# Patient Record
Sex: FEMALE | Race: BLACK OR AFRICAN AMERICAN | NOT HISPANIC OR LATINO | URBAN - METROPOLITAN AREA
[De-identification: names, ages, dates, MRNs, and addresses within clinical notes are randomized per-mention and may not be internally consistent; named-entity substitution may affect disease eponyms.]

---

## 2018-04-09 ENCOUNTER — EMERGENCY (EMERGENCY)
Facility: HOSPITAL | Age: 53
LOS: 0 days | Discharge: HOME | End: 2018-04-09
Attending: EMERGENCY MEDICINE
Payer: COMMERCIAL

## 2018-04-09 VITALS
HEIGHT: 62 IN | RESPIRATION RATE: 17 BRPM | WEIGHT: 177.91 LBS | SYSTOLIC BLOOD PRESSURE: 135 MMHG | HEART RATE: 100 BPM | TEMPERATURE: 98 F | OXYGEN SATURATION: 100 % | DIASTOLIC BLOOD PRESSURE: 69 MMHG

## 2018-04-09 VITALS
HEART RATE: 88 BPM | DIASTOLIC BLOOD PRESSURE: 58 MMHG | RESPIRATION RATE: 18 BRPM | SYSTOLIC BLOOD PRESSURE: 107 MMHG | OXYGEN SATURATION: 97 % | TEMPERATURE: 99 F

## 2018-04-09 DIAGNOSIS — I10 ESSENTIAL (PRIMARY) HYPERTENSION: ICD-10-CM

## 2018-04-09 DIAGNOSIS — Z98.51 TUBAL LIGATION STATUS: Chronic | ICD-10-CM

## 2018-04-09 DIAGNOSIS — R07.9 CHEST PAIN, UNSPECIFIED: ICD-10-CM

## 2018-04-09 DIAGNOSIS — E78.00 PURE HYPERCHOLESTEROLEMIA, UNSPECIFIED: ICD-10-CM

## 2018-04-09 DIAGNOSIS — Z90.49 ACQUIRED ABSENCE OF OTHER SPECIFIED PARTS OF DIGESTIVE TRACT: Chronic | ICD-10-CM

## 2018-04-09 LAB
ALBUMIN SERPL ELPH-MCNC: 3.6 G/DL — SIGNIFICANT CHANGE UP (ref 3.3–5)
ALP SERPL-CCNC: 95 U/L — SIGNIFICANT CHANGE UP (ref 40–120)
ALT FLD-CCNC: 46 U/L — SIGNIFICANT CHANGE UP (ref 12–78)
ANION GAP SERPL CALC-SCNC: 10 MMOL/L — SIGNIFICANT CHANGE UP (ref 5–17)
APPEARANCE UR: CLEAR — SIGNIFICANT CHANGE UP
APTT BLD: 32.3 SEC — SIGNIFICANT CHANGE UP (ref 27.5–37.4)
AST SERPL-CCNC: 38 U/L — HIGH (ref 15–37)
BACTERIA # UR AUTO: ABNORMAL
BASOPHILS # BLD AUTO: 0.05 K/UL — SIGNIFICANT CHANGE UP (ref 0–0.2)
BASOPHILS NFR BLD AUTO: 0.5 % — SIGNIFICANT CHANGE UP (ref 0–2)
BILIRUB SERPL-MCNC: 0.4 MG/DL — SIGNIFICANT CHANGE UP (ref 0.2–1.2)
BILIRUB UR-MCNC: NEGATIVE — SIGNIFICANT CHANGE UP
BUN SERPL-MCNC: 11 MG/DL — SIGNIFICANT CHANGE UP (ref 7–23)
CALCIUM SERPL-MCNC: 8 MG/DL — LOW (ref 8.5–10.1)
CHLORIDE SERPL-SCNC: 104 MMOL/L — SIGNIFICANT CHANGE UP (ref 96–108)
CK MB CFR SERPL CALC: 0.8 NG/ML — SIGNIFICANT CHANGE UP (ref 0.5–3.6)
CO2 SERPL-SCNC: 27 MMOL/L — SIGNIFICANT CHANGE UP (ref 22–31)
COLOR SPEC: YELLOW — SIGNIFICANT CHANGE UP
CREAT SERPL-MCNC: 0.73 MG/DL — SIGNIFICANT CHANGE UP (ref 0.5–1.3)
D DIMER BLD IA.RAPID-MCNC: 174 NG/ML DDU — SIGNIFICANT CHANGE UP
DIFF PNL FLD: ABNORMAL
EOSINOPHIL # BLD AUTO: 0 K/UL — SIGNIFICANT CHANGE UP (ref 0–0.5)
EOSINOPHIL NFR BLD AUTO: 0 % — SIGNIFICANT CHANGE UP (ref 0–6)
EPI CELLS # UR: ABNORMAL
GLUCOSE SERPL-MCNC: 129 MG/DL — HIGH (ref 70–99)
GLUCOSE UR QL: NEGATIVE MG/DL — SIGNIFICANT CHANGE UP
HCG SERPL-ACNC: <1 MIU/ML — SIGNIFICANT CHANGE UP
HCT VFR BLD CALC: 40.8 % — SIGNIFICANT CHANGE UP (ref 34.5–45)
HGB BLD-MCNC: 13 G/DL — SIGNIFICANT CHANGE UP (ref 11.5–15.5)
IMM GRANULOCYTES NFR BLD AUTO: 0.3 % — SIGNIFICANT CHANGE UP (ref 0–1.5)
INR BLD: 1.04 RATIO — SIGNIFICANT CHANGE UP (ref 0.88–1.16)
KETONES UR-MCNC: NEGATIVE — SIGNIFICANT CHANGE UP
LEUKOCYTE ESTERASE UR-ACNC: NEGATIVE — SIGNIFICANT CHANGE UP
LIDOCAIN IGE QN: 61 U/L — LOW (ref 73–393)
LYMPHOCYTES # BLD AUTO: 1.33 K/UL — SIGNIFICANT CHANGE UP (ref 1–3.3)
LYMPHOCYTES # BLD AUTO: 13.6 % — SIGNIFICANT CHANGE UP (ref 13–44)
MCHC RBC-ENTMCNC: 26.3 PG — LOW (ref 27–34)
MCHC RBC-ENTMCNC: 31.9 GM/DL — LOW (ref 32–36)
MCV RBC AUTO: 82.4 FL — SIGNIFICANT CHANGE UP (ref 80–100)
MONOCYTES # BLD AUTO: 0.32 K/UL — SIGNIFICANT CHANGE UP (ref 0–0.9)
MONOCYTES NFR BLD AUTO: 3.3 % — SIGNIFICANT CHANGE UP (ref 2–14)
NEUTROPHILS # BLD AUTO: 8.02 K/UL — HIGH (ref 1.8–7.4)
NEUTROPHILS NFR BLD AUTO: 82.3 % — HIGH (ref 43–77)
NITRITE UR-MCNC: NEGATIVE — SIGNIFICANT CHANGE UP
NRBC # BLD: 0 /100 WBCS — SIGNIFICANT CHANGE UP (ref 0–0)
NT-PROBNP SERPL-SCNC: 33 PG/ML — SIGNIFICANT CHANGE UP (ref 0–125)
PH UR: 6.5 — SIGNIFICANT CHANGE UP (ref 5–8)
PLATELET # BLD AUTO: 319 K/UL — SIGNIFICANT CHANGE UP (ref 150–400)
POTASSIUM SERPL-MCNC: 3.2 MMOL/L — LOW (ref 3.5–5.3)
POTASSIUM SERPL-SCNC: 3.2 MMOL/L — LOW (ref 3.5–5.3)
PROT SERPL-MCNC: 8.3 GM/DL — SIGNIFICANT CHANGE UP (ref 6–8.3)
PROT UR-MCNC: 15 MG/DL
PROTHROM AB SERPL-ACNC: 11.3 SEC — SIGNIFICANT CHANGE UP (ref 9.8–12.7)
RBC # BLD: 4.95 M/UL — SIGNIFICANT CHANGE UP (ref 3.8–5.2)
RBC # FLD: 14.1 % — SIGNIFICANT CHANGE UP (ref 10.3–14.5)
RBC CASTS # UR COMP ASSIST: SIGNIFICANT CHANGE UP /HPF (ref 0–4)
SODIUM SERPL-SCNC: 141 MMOL/L — SIGNIFICANT CHANGE UP (ref 135–145)
SP GR SPEC: 1.01 — SIGNIFICANT CHANGE UP (ref 1.01–1.02)
TROPONIN I SERPL-MCNC: <.015 NG/ML — SIGNIFICANT CHANGE UP (ref 0.01–0.04)
TROPONIN I SERPL-MCNC: <.015 NG/ML — SIGNIFICANT CHANGE UP (ref 0.01–0.04)
UROBILINOGEN FLD QL: NEGATIVE MG/DL — SIGNIFICANT CHANGE UP
WBC # BLD: 9.75 K/UL — SIGNIFICANT CHANGE UP (ref 3.8–10.5)
WBC # FLD AUTO: 9.75 K/UL — SIGNIFICANT CHANGE UP (ref 3.8–10.5)

## 2018-04-09 PROCEDURE — 71045 X-RAY EXAM CHEST 1 VIEW: CPT | Mod: 26

## 2018-04-09 PROCEDURE — 71275 CT ANGIOGRAPHY CHEST: CPT | Mod: 26

## 2018-04-09 PROCEDURE — 74174 CTA ABD&PLVS W/CONTRAST: CPT | Mod: 26

## 2018-04-09 PROCEDURE — 99285 EMERGENCY DEPT VISIT HI MDM: CPT

## 2018-04-09 RX ORDER — MORPHINE SULFATE 50 MG/1
2 CAPSULE, EXTENDED RELEASE ORAL ONCE
Qty: 0 | Refills: 0 | Status: DISCONTINUED | OUTPATIENT
Start: 2018-04-09 | End: 2018-04-09

## 2018-04-09 RX ORDER — LIDOCAINE 4 G/100G
10 CREAM TOPICAL ONCE
Qty: 0 | Refills: 0 | Status: COMPLETED | OUTPATIENT
Start: 2018-04-09 | End: 2018-04-09

## 2018-04-09 RX ORDER — PANTOPRAZOLE SODIUM 20 MG/1
40 TABLET, DELAYED RELEASE ORAL ONCE
Qty: 0 | Refills: 0 | Status: COMPLETED | OUTPATIENT
Start: 2018-04-09 | End: 2018-04-09

## 2018-04-09 RX ORDER — ONDANSETRON 8 MG/1
4 TABLET, FILM COATED ORAL ONCE
Qty: 0 | Refills: 0 | Status: COMPLETED | OUTPATIENT
Start: 2018-04-09 | End: 2018-04-09

## 2018-04-09 RX ADMIN — MORPHINE SULFATE 2 MILLIGRAM(S): 50 CAPSULE, EXTENDED RELEASE ORAL at 09:41

## 2018-04-09 RX ADMIN — PANTOPRAZOLE SODIUM 40 MILLIGRAM(S): 20 TABLET, DELAYED RELEASE ORAL at 13:33

## 2018-04-09 RX ADMIN — LIDOCAINE 10 MILLILITER(S): 4 CREAM TOPICAL at 13:32

## 2018-04-09 RX ADMIN — MORPHINE SULFATE 2 MILLIGRAM(S): 50 CAPSULE, EXTENDED RELEASE ORAL at 12:16

## 2018-04-09 RX ADMIN — Medication 30 MILLILITER(S): at 13:33

## 2018-04-09 RX ADMIN — ONDANSETRON 4 MILLIGRAM(S): 8 TABLET, FILM COATED ORAL at 09:42

## 2018-04-09 NOTE — ED PROVIDER NOTE - CARDIAC, MLM
Normal rate, regular rhythm.  Heart sounds S1, S2.  No murmurs, rubs or gallops. /72 on R, 132/71 on L, pulses equal

## 2018-04-09 NOTE — ED PROVIDER NOTE - PROGRESS NOTE DETAILS
Pt is feeling better after GI cocktail. Serial trops negative, ddimer and CT angio negative. Pt eager for d/c, will fu w PMD.

## 2018-04-09 NOTE — ED PROVIDER NOTE - MEDICAL DECISION MAKING DETAILS
Ddx: ro dissection/ ro esophageal perforation given emesis, but no hematemesis/ gastritis/ pancreatitis/ acs, heart score 1  Plan: Cbc, cmp, ddimer, coags, CT angio, reassess

## 2018-04-09 NOTE — ED ADULT NURSE NOTE - OBJECTIVE STATEMENT
received pt to bed 10 alert and oriented times 4. complaining of chest and back pain. "feel like something sitting on my chest"  since waking this am at 7. pt placed on cm. stat ekg. awaiting eval by md

## 2018-04-09 NOTE — ED PROVIDER NOTE - OBJECTIVE STATEMENT
Pt is a 54 yo lady with a pmhx of HTN, HL who presents to the ED with chest pain after vomiting. She had alcohol last night, not a usual drinker, and had several episodes of emesis, and afterwards had chest pain. No hematemesis, no coffee ground emesis. No abdominal pain. No pleuritic pain, no hx of dvt/pe. No numbness or tingling. Pain radiates to back. No tearing pain, more of a pressure pain.

## 2018-04-10 LAB
CULTURE RESULTS: SIGNIFICANT CHANGE UP
SPECIMEN SOURCE: SIGNIFICANT CHANGE UP

## 2019-02-20 ENCOUNTER — INPATIENT (INPATIENT)
Facility: HOSPITAL | Age: 54
LOS: 6 days | Discharge: HOME | End: 2019-02-27
Attending: STUDENT IN AN ORGANIZED HEALTH CARE EDUCATION/TRAINING PROGRAM | Admitting: STUDENT IN AN ORGANIZED HEALTH CARE EDUCATION/TRAINING PROGRAM
Payer: COMMERCIAL

## 2019-02-20 VITALS
HEART RATE: 92 BPM | TEMPERATURE: 98 F | RESPIRATION RATE: 16 BRPM | DIASTOLIC BLOOD PRESSURE: 93 MMHG | SYSTOLIC BLOOD PRESSURE: 137 MMHG | OXYGEN SATURATION: 100 %

## 2019-02-20 DIAGNOSIS — K57.32 DIVERTICULITIS OF LARGE INTESTINE WITHOUT PERFORATION OR ABSCESS WITHOUT BLEEDING: ICD-10-CM

## 2019-02-20 DIAGNOSIS — Z98.51 TUBAL LIGATION STATUS: Chronic | ICD-10-CM

## 2019-02-20 DIAGNOSIS — Z90.49 ACQUIRED ABSENCE OF OTHER SPECIFIED PARTS OF DIGESTIVE TRACT: Chronic | ICD-10-CM

## 2019-02-20 LAB
ALBUMIN SERPL ELPH-MCNC: 3.7 G/DL — SIGNIFICANT CHANGE UP (ref 3.3–5)
ALP SERPL-CCNC: 83 U/L — SIGNIFICANT CHANGE UP (ref 40–120)
ALT FLD-CCNC: 19 U/L — SIGNIFICANT CHANGE UP (ref 4–33)
ANION GAP SERPL CALC-SCNC: 13 MMO/L — SIGNIFICANT CHANGE UP (ref 7–14)
APPEARANCE UR: CLEAR — SIGNIFICANT CHANGE UP
AST SERPL-CCNC: 25 U/L — SIGNIFICANT CHANGE UP (ref 4–32)
BACTERIA # UR AUTO: NEGATIVE — SIGNIFICANT CHANGE UP
BASOPHILS # BLD AUTO: 0.02 K/UL — SIGNIFICANT CHANGE UP (ref 0–0.2)
BASOPHILS NFR BLD AUTO: 0.2 % — SIGNIFICANT CHANGE UP (ref 0–2)
BILIRUB SERPL-MCNC: 0.4 MG/DL — SIGNIFICANT CHANGE UP (ref 0.2–1.2)
BILIRUB UR-MCNC: NEGATIVE — SIGNIFICANT CHANGE UP
BLOOD UR QL VISUAL: SIGNIFICANT CHANGE UP
BUN SERPL-MCNC: 11 MG/DL — SIGNIFICANT CHANGE UP (ref 7–23)
CALCIUM SERPL-MCNC: 8.6 MG/DL — SIGNIFICANT CHANGE UP (ref 8.4–10.5)
CHLORIDE SERPL-SCNC: 100 MMOL/L — SIGNIFICANT CHANGE UP (ref 98–107)
CO2 SERPL-SCNC: 25 MMOL/L — SIGNIFICANT CHANGE UP (ref 22–31)
COLOR SPEC: SIGNIFICANT CHANGE UP
CREAT SERPL-MCNC: 0.79 MG/DL — SIGNIFICANT CHANGE UP (ref 0.5–1.3)
EOSINOPHIL # BLD AUTO: 0.17 K/UL — SIGNIFICANT CHANGE UP (ref 0–0.5)
EOSINOPHIL NFR BLD AUTO: 1.6 % — SIGNIFICANT CHANGE UP (ref 0–6)
GLUCOSE SERPL-MCNC: 105 MG/DL — HIGH (ref 70–99)
GLUCOSE UR-MCNC: NEGATIVE — SIGNIFICANT CHANGE UP
HCT VFR BLD CALC: 35.3 % — SIGNIFICANT CHANGE UP (ref 34.5–45)
HGB BLD-MCNC: 11.1 G/DL — LOW (ref 11.5–15.5)
HYALINE CASTS # UR AUTO: NEGATIVE — SIGNIFICANT CHANGE UP
IMM GRANULOCYTES NFR BLD AUTO: 0.3 % — SIGNIFICANT CHANGE UP (ref 0–1.5)
KETONES UR-MCNC: NEGATIVE — SIGNIFICANT CHANGE UP
LEUKOCYTE ESTERASE UR-ACNC: NEGATIVE — SIGNIFICANT CHANGE UP
LIDOCAIN IGE QN: 17.6 U/L — SIGNIFICANT CHANGE UP (ref 7–60)
LYMPHOCYTES # BLD AUTO: 2.22 K/UL — SIGNIFICANT CHANGE UP (ref 1–3.3)
LYMPHOCYTES # BLD AUTO: 21.3 % — SIGNIFICANT CHANGE UP (ref 13–44)
MCHC RBC-ENTMCNC: 25.9 PG — LOW (ref 27–34)
MCHC RBC-ENTMCNC: 31.4 % — LOW (ref 32–36)
MCV RBC AUTO: 82.5 FL — SIGNIFICANT CHANGE UP (ref 80–100)
MONOCYTES # BLD AUTO: 0.89 K/UL — SIGNIFICANT CHANGE UP (ref 0–0.9)
MONOCYTES NFR BLD AUTO: 8.6 % — SIGNIFICANT CHANGE UP (ref 2–14)
NEUTROPHILS # BLD AUTO: 7.07 K/UL — SIGNIFICANT CHANGE UP (ref 1.8–7.4)
NEUTROPHILS NFR BLD AUTO: 68 % — SIGNIFICANT CHANGE UP (ref 43–77)
NITRITE UR-MCNC: NEGATIVE — SIGNIFICANT CHANGE UP
NRBC # FLD: 0 K/UL — LOW (ref 25–125)
PH UR: 6.5 — SIGNIFICANT CHANGE UP (ref 5–8)
PLATELET # BLD AUTO: 219 K/UL — SIGNIFICANT CHANGE UP (ref 150–400)
PMV BLD: 10.9 FL — SIGNIFICANT CHANGE UP (ref 7–13)
POTASSIUM SERPL-MCNC: 3.1 MMOL/L — LOW (ref 3.5–5.3)
POTASSIUM SERPL-SCNC: 3.1 MMOL/L — LOW (ref 3.5–5.3)
PROT SERPL-MCNC: 6.9 G/DL — SIGNIFICANT CHANGE UP (ref 6–8.3)
PROT UR-MCNC: 10 — SIGNIFICANT CHANGE UP
RBC # BLD: 4.28 M/UL — SIGNIFICANT CHANGE UP (ref 3.8–5.2)
RBC # FLD: 13.8 % — SIGNIFICANT CHANGE UP (ref 10.3–14.5)
RBC CASTS # UR COMP ASSIST: SIGNIFICANT CHANGE UP (ref 0–?)
SODIUM SERPL-SCNC: 138 MMOL/L — SIGNIFICANT CHANGE UP (ref 135–145)
SP GR SPEC: 1.01 — SIGNIFICANT CHANGE UP (ref 1–1.04)
SQUAMOUS # UR AUTO: SIGNIFICANT CHANGE UP
UROBILINOGEN FLD QL: NORMAL — SIGNIFICANT CHANGE UP
WBC # BLD: 10.4 K/UL — SIGNIFICANT CHANGE UP (ref 3.8–10.5)
WBC # FLD AUTO: 10.4 K/UL — SIGNIFICANT CHANGE UP (ref 3.8–10.5)
WBC UR QL: HIGH (ref 0–?)

## 2019-02-20 PROCEDURE — 74177 CT ABD & PELVIS W/CONTRAST: CPT | Mod: 26

## 2019-02-20 PROCEDURE — 99222 1ST HOSP IP/OBS MODERATE 55: CPT | Mod: GC

## 2019-02-20 RX ORDER — ONDANSETRON 8 MG/1
4 TABLET, FILM COATED ORAL EVERY 6 HOURS
Qty: 0 | Refills: 0 | Status: DISCONTINUED | OUTPATIENT
Start: 2019-02-20 | End: 2019-02-27

## 2019-02-20 RX ORDER — MORPHINE SULFATE 50 MG/1
4 CAPSULE, EXTENDED RELEASE ORAL ONCE
Qty: 0 | Refills: 0 | Status: DISCONTINUED | OUTPATIENT
Start: 2019-02-20 | End: 2019-02-20

## 2019-02-20 RX ORDER — SODIUM CHLORIDE 9 MG/ML
1000 INJECTION INTRAMUSCULAR; INTRAVENOUS; SUBCUTANEOUS ONCE
Qty: 0 | Refills: 0 | Status: COMPLETED | OUTPATIENT
Start: 2019-02-20 | End: 2019-02-20

## 2019-02-20 RX ORDER — ENOXAPARIN SODIUM 100 MG/ML
40 INJECTION SUBCUTANEOUS DAILY
Qty: 0 | Refills: 0 | Status: DISCONTINUED | OUTPATIENT
Start: 2019-02-20 | End: 2019-02-27

## 2019-02-20 RX ORDER — PIPERACILLIN AND TAZOBACTAM 4; .5 G/20ML; G/20ML
3.38 INJECTION, POWDER, LYOPHILIZED, FOR SOLUTION INTRAVENOUS EVERY 8 HOURS
Qty: 0 | Refills: 0 | Status: DISCONTINUED | OUTPATIENT
Start: 2019-02-21 | End: 2019-02-27

## 2019-02-20 RX ORDER — HYDROMORPHONE HYDROCHLORIDE 2 MG/ML
0.5 INJECTION INTRAMUSCULAR; INTRAVENOUS; SUBCUTANEOUS EVERY 4 HOURS
Qty: 0 | Refills: 0 | Status: DISCONTINUED | OUTPATIENT
Start: 2019-02-20 | End: 2019-02-26

## 2019-02-20 RX ORDER — ACETAMINOPHEN 500 MG
1000 TABLET ORAL ONCE
Qty: 0 | Refills: 0 | Status: COMPLETED | OUTPATIENT
Start: 2019-02-20 | End: 2019-02-20

## 2019-02-20 RX ORDER — SODIUM CHLORIDE 9 MG/ML
1000 INJECTION, SOLUTION INTRAVENOUS
Qty: 0 | Refills: 0 | Status: DISCONTINUED | OUTPATIENT
Start: 2019-02-20 | End: 2019-02-21

## 2019-02-20 RX ORDER — KETOROLAC TROMETHAMINE 30 MG/ML
15 SYRINGE (ML) INJECTION ONCE
Qty: 0 | Refills: 0 | Status: DISCONTINUED | OUTPATIENT
Start: 2019-02-20 | End: 2019-02-20

## 2019-02-20 RX ORDER — PIPERACILLIN AND TAZOBACTAM 4; .5 G/20ML; G/20ML
3.38 INJECTION, POWDER, LYOPHILIZED, FOR SOLUTION INTRAVENOUS ONCE
Qty: 0 | Refills: 0 | Status: COMPLETED | OUTPATIENT
Start: 2019-02-20 | End: 2019-02-20

## 2019-02-20 RX ADMIN — Medication 15 MILLIGRAM(S): at 18:49

## 2019-02-20 RX ADMIN — Medication 400 MILLIGRAM(S): at 23:57

## 2019-02-20 RX ADMIN — PIPERACILLIN AND TAZOBACTAM 200 GRAM(S): 4; .5 INJECTION, POWDER, LYOPHILIZED, FOR SOLUTION INTRAVENOUS at 21:38

## 2019-02-20 RX ADMIN — SODIUM CHLORIDE 1000 MILLILITER(S): 9 INJECTION INTRAMUSCULAR; INTRAVENOUS; SUBCUTANEOUS at 18:13

## 2019-02-20 RX ADMIN — Medication 15 MILLIGRAM(S): at 19:05

## 2019-02-20 RX ADMIN — SODIUM CHLORIDE 1000 MILLILITER(S): 9 INJECTION INTRAMUSCULAR; INTRAVENOUS; SUBCUTANEOUS at 19:00

## 2019-02-20 RX ADMIN — MORPHINE SULFATE 4 MILLIGRAM(S): 50 CAPSULE, EXTENDED RELEASE ORAL at 20:34

## 2019-02-20 RX ADMIN — PIPERACILLIN AND TAZOBACTAM 3.38 GRAM(S): 4; .5 INJECTION, POWDER, LYOPHILIZED, FOR SOLUTION INTRAVENOUS at 22:19

## 2019-02-20 NOTE — H&P ADULT - ASSESSMENT
53F with diverticulitis and 1.7x3.8cm collection.     - NPO  - Zosyn  - IR consult for drainage  - IV fluids  - DVT ppx  - Pain control  - Abdominal exams  - Discussed with attending    [id number]

## 2019-02-20 NOTE — ED ADULT TRIAGE NOTE - CHIEF COMPLAINT QUOTE
C/o lower abd "squeezing pain" as well as LUQ pain since Saturday. Denies N/V/D, fevers/chills. Endorses abd pressure when urinating, denies dysuria/hematuria. H/o fibroids, HTN.

## 2019-02-20 NOTE — H&P ADULT - HISTORY OF PRESENT ILLNESS
53F presents with abdominal pain. The pain be 4 days ago in the left lower quadrant. She took pain medication to control the symptoms but today the pain was intolerable which brought her to the ED. The pain radiates around to her back. She has not had any symptoms such as fevers, chills, n/v/cp/sob. Patient tolerating a diet, passing gas and stool.     Last colonoscopy 2.5 years ago was normal.

## 2019-02-20 NOTE — ED ADULT NURSE NOTE - OBJECTIVE STATEMENT
Pt received in intake spot 7, A&OX4, NAD.  c/o lower abdominal/pelvic pain radiating to LLQ and L flank starting approx 2 days ago.  Pt denies any nausea/vomiting.  Denies any vaginal bleeding/hematuria/dysuria.  Pt endorses relief of pain after urinating.  Pt states was evaluated by GYN because "I thought the pain was secondary to menopause, but she said everything was alright."  Denies any other physical complaints. Labs sent.  UA/C&S sent.  Will continue to monitor.

## 2019-02-20 NOTE — ED PROVIDER NOTE - CLINICAL SUMMARY MEDICAL DECISION MAKING FREE TEXT BOX
53yF w/pmhx HTN (not on meds), uterine fibroids, sickle cell trait, hx cholecystectomy p/w left flank/abdominal pain. +CVA tenderness, LUQ tenderness and LLQ tenderness. DDx includes kidney stone, pyelonephritis, hernia. Will get CT abd/pelvis, cbc/cmp/lipase, fluids and analgesia. Will continue to reassess.

## 2019-02-20 NOTE — H&P ADULT - ATTENDING COMMENTS
I saw and examined the patient and agree with the above note.    Acute Perforated Diverticulitis, K57.20 Colonic diverticular abscess   -No indication for immediate surgical intervention  -Interventional radiology consult for percutaneous drainage  -NPO/IVF  -Antibiotics as ordered - consider PO cipro/flagyl upon discharge/PO transition  -serial abd exams  -trend wbc/procalcitonin  -Indications for colectomy would include severely inhibited life-style due to hospitalization/abx therapy or life threatening perforation. Surgery can include a primary anastomosis or colostomy and eventual reversal. The risks and benefits of an operation will be discussed at that time.    I spent 55min reviewing data, images and documentation as well as in care coordination.  Greater than 50% of my time was spent in discussion regarding medical mgmt and possible surgical intervention.    Jackson Montesinos MD   Acute and Critical Care Surgery  (Cell: [phone])  Email: [email or web address]    The Acute Care Surgery (B Team) Attending Group Practice:  Dr. Mi Tay, Dr. Fito Dickerson, Dr. Astrid Dickerson, Dr. Jackson Montesinos    Urgent issues - spectra [phone] or [id number]  Nonurgent issues - [phone]  Patient appointments or after hours - [phone]

## 2019-02-20 NOTE — ED ADULT NURSE NOTE - NSIMPLEMENTINTERV_GEN_ALL_ED
Implemented All Universal Safety Interventions:  [place] to call system. Call bell, personal items and telephone within reach. Instruct patient to call for assistance. Room bathroom lighting operational. Non-slip footwear when patient is off stretcher. Physically safe environment: no spills, clutter or unnecessary equipment. Stretcher in lowest position, wheels locked, appropriate side rails in place.

## 2019-02-20 NOTE — ED PROVIDER NOTE - OBJECTIVE STATEMENT
54yo female PMHx fibroids presented to ED c/o LUQ pain x 5 days. Pt reports she felt sharp pain in her left upper abdomen 5 days ago, that has gotten worse and constant over the past 2 days, moving makes it worse and nothing makes it better. No N/V. Pt also reports that she has LLQ pain that she feels is separate from LUQ pain. LLQ pain radiates to her groin, has been present on/off for 5 months. Went to GYN 3 weeks ago to make sure it wasn't fibroids, US done and fibroids are decreasing in size, as per pt. 54yo female PMHx fibroids, HTN (no longer on meds), sickle cell trait, hx cholecystectomy presented to ED c/o LUQ pain x 5 days. Pt reports she felt sharp pain in her left upper abdomen 5 days ago, that has gotten worse and constant over the past 2 days, moving makes it worse and nothing makes it better. No N/V. Pt also reports that she has LLQ pain that she feels is separate from LUQ pain. LLQ pain radiates to her groin, has been present on/off for 5 months. Went to GYN 3 weeks ago to make sure it wasn't fibroids, US done and fibroids are decreasing in size, as per pt. Pt states she has never felt this pain before. Denies fever/chills, vomiting, diarrhea, dysuria, urinary frequency, trauma/injury, headache, dizziness, hx of hernia or any other concerns.

## 2019-02-20 NOTE — H&P ADULT - NSHPLABSRESULTS_GEN_ALL_CORE
Vital Signs Last 24 Hrs  T(C): 37.1 (2019 19:55), Max: 37.1 (2019 19:55)  T(F): 98.8 (2019 19:55), Max: 98.8 (2019 19:55)  HR: 90 (2019 19:55) (90 - 92)  BP: 143/86 (2019 19:55) (137/93 - 143/86)  BP(mean): --  RR: 18 (2019 19:55) (16 - 18)  SpO2: 100% (:55) (100% - 100%)      LABS:                        11.1   10.40 )-----------( 219      ( 2019 18:35 )             35.3     02-20    138  |  100  |  11  ----------------------------<  105<H>  3.1<L>   |  25  |  0.79    Ca    8.6      2019 18:35    TPro  6.9  /  Alb  3.7  /  TBili  0.4  /  DBili  x   /  AST  25  /  ALT  19  /  AlkPhos  83  02-20      Urinalysis Basic - ( 2019 18:35 )    Color: LIGHT YELLOW / Appearance: CLEAR / S.012 / pH: 6.5  Gluc: NEGATIVE / Ketone: NEGATIVE  / Bili: NEGATIVE / Urobili: NORMAL   Blood: SMALL / Protein: 10 / Nitrite: NEGATIVE   Leuk Esterase: NEGATIVE / RBC: 0-2 / WBC 6-10   Sq Epi: FEW / Non Sq Epi: x / Bacteria: NEGATIVE        INs and OUTs:    < from: CT Abdomen and Pelvis w/ IV Cont (19 @ 19:39) >    IMPRESSION:   Acute diverticulitis of the distal descending colon with an associated   small air-containing collection.    < end of copied text >

## 2019-02-20 NOTE — ED PROVIDER NOTE - PROGRESS NOTE DETAILS
Patient was received at sign out from ABILIO Powers pending CT A/P. Results of CT +for acute descending diverticulitis with air collection, surgery consulted. Surgery evaluated patient and state will admit to their service under eula Ferrer. Pt aware of results and plan for admission.

## 2019-02-20 NOTE — H&P ADULT - NSHPPHYSICALEXAM_GEN_ALL_CORE
PHYSICAL EXAM:  GENERAL: NAD, well-developed  HEAD:  Atraumatic, Normocephalic  EYES: EOMI, conjunctiva and sclera clear  NECK: Supple, No JVD  CHEST/LUNG: Clear to auscultation bilaterally; No wheeze  HEART: Regular rate and rhythm; No murmurs, rubs, or gallops  ABDOMEN: Soft, tender in LLQ, Nondistended; Bowel sounds present  EXTREMITIES:  2+ Peripheral Pulses, No clubbing, cyanosis, or edema  PSYCH: AAOx3  NEUROLOGY: non-focal  SKIN: No rashes or lesions

## 2019-02-20 NOTE — ED PROVIDER NOTE - ATTENDING CONTRIBUTION TO CARE
Tavares: I have seen and examined the patient face to face, have reviewed and addended the HPI, PE and a/p as necessary.     52yo female PMHx fibroids, HTN (no longer on meds), sickle cell trait, hx cholecystectomy presented to ED c/o LUQ pain x 5 days.     54 yo F fibroids, sickle cell trait, h/o cholecystectomy, a/w LUQ, L flank pain radiating down to the LLQ and L groin.  Reports pain for 5 days progressively worsening today.  Reports being unable to flex L leg 2/2 pain.  Pt had a recent sono 2/2 pain with decreasing fibroid size.  Reports no fever, no chills, no diarrhea, no nausea or vomiting.   GEN - NAD; well appearing; A+O x3; non-toxic appearing CARD -s1s2, RRR, no M,G,R; PULM - CTA b/l, symmetric breath sounds; ABD:  +BS, exquisitely tender to palpation in LLQ worsens with flexion of Left leg with rebound, no masses; BACK: no CVA tenderness, Normal  spine; EXT: symmetric pulses, 2+ dp, capillary refill < 2 seconds, no clubbing, no cyanosis, no edema     54 yo F a/w LLQ pain likely acute abdominal pathology, given ttp in LLQ diverticulitis vs hernia (less likely) given presentation, check UA, possible renal stone given chronicity, though not classic intermittent pain.  check ct a/p with contrast, labs. Reassess after pain meds.

## 2019-02-20 NOTE — ED PROVIDER NOTE - PMH
<<----- Click to add NO pertinent Past Medical History High cholesterol    HTN (hypertension)    No pertinent past medical history

## 2019-02-21 LAB
ANION GAP SERPL CALC-SCNC: 14 MMO/L — SIGNIFICANT CHANGE UP (ref 7–14)
APTT BLD: 34.9 SEC — SIGNIFICANT CHANGE UP (ref 27.5–36.3)
BUN SERPL-MCNC: 8 MG/DL — SIGNIFICANT CHANGE UP (ref 7–23)
CALCIUM SERPL-MCNC: 8.6 MG/DL — SIGNIFICANT CHANGE UP (ref 8.4–10.5)
CHLORIDE SERPL-SCNC: 102 MMOL/L — SIGNIFICANT CHANGE UP (ref 98–107)
CO2 SERPL-SCNC: 23 MMOL/L — SIGNIFICANT CHANGE UP (ref 22–31)
CREAT SERPL-MCNC: 0.6 MG/DL — SIGNIFICANT CHANGE UP (ref 0.5–1.3)
GLUCOSE SERPL-MCNC: 113 MG/DL — HIGH (ref 70–99)
HCT VFR BLD CALC: 38 % — SIGNIFICANT CHANGE UP (ref 34.5–45)
HGB BLD-MCNC: 11.6 G/DL — SIGNIFICANT CHANGE UP (ref 11.5–15.5)
INR BLD: 1.03 — SIGNIFICANT CHANGE UP (ref 0.88–1.17)
MAGNESIUM SERPL-MCNC: 2.1 MG/DL — SIGNIFICANT CHANGE UP (ref 1.6–2.6)
MCHC RBC-ENTMCNC: 26 PG — LOW (ref 27–34)
MCHC RBC-ENTMCNC: 30.5 % — LOW (ref 32–36)
MCV RBC AUTO: 85.2 FL — SIGNIFICANT CHANGE UP (ref 80–100)
NRBC # FLD: 0 K/UL — LOW (ref 25–125)
PHOSPHATE SERPL-MCNC: 2.8 MG/DL — SIGNIFICANT CHANGE UP (ref 2.5–4.5)
PLATELET # BLD AUTO: 218 K/UL — SIGNIFICANT CHANGE UP (ref 150–400)
PMV BLD: 11.2 FL — SIGNIFICANT CHANGE UP (ref 7–13)
POTASSIUM SERPL-MCNC: 3.2 MMOL/L — LOW (ref 3.5–5.3)
POTASSIUM SERPL-SCNC: 3.2 MMOL/L — LOW (ref 3.5–5.3)
PROTHROM AB SERPL-ACNC: 11.5 SEC — SIGNIFICANT CHANGE UP (ref 9.8–13.1)
RBC # BLD: 4.46 M/UL — SIGNIFICANT CHANGE UP (ref 3.8–5.2)
RBC # FLD: 13.7 % — SIGNIFICANT CHANGE UP (ref 10.3–14.5)
SODIUM SERPL-SCNC: 139 MMOL/L — SIGNIFICANT CHANGE UP (ref 135–145)
WBC # BLD: 9 K/UL — SIGNIFICANT CHANGE UP (ref 3.8–10.5)
WBC # FLD AUTO: 9 K/UL — SIGNIFICANT CHANGE UP (ref 3.8–10.5)

## 2019-02-21 PROCEDURE — 93010 ELECTROCARDIOGRAM REPORT: CPT

## 2019-02-21 RX ORDER — POTASSIUM CHLORIDE 20 MEQ
10 PACKET (EA) ORAL
Qty: 0 | Refills: 0 | Status: COMPLETED | OUTPATIENT
Start: 2019-02-21 | End: 2019-02-21

## 2019-02-21 RX ORDER — SODIUM CHLORIDE 9 MG/ML
1000 INJECTION, SOLUTION INTRAVENOUS
Qty: 0 | Refills: 0 | Status: DISCONTINUED | OUTPATIENT
Start: 2019-02-21 | End: 2019-02-26

## 2019-02-21 RX ORDER — FAMOTIDINE 10 MG/ML
20 INJECTION INTRAVENOUS DAILY
Qty: 0 | Refills: 0 | Status: DISCONTINUED | OUTPATIENT
Start: 2019-02-21 | End: 2019-02-27

## 2019-02-21 RX ORDER — FAMOTIDINE 10 MG/ML
20 INJECTION INTRAVENOUS DAILY
Qty: 0 | Refills: 0 | Status: DISCONTINUED | OUTPATIENT
Start: 2019-02-21 | End: 2019-02-21

## 2019-02-21 RX ADMIN — PIPERACILLIN AND TAZOBACTAM 25 GRAM(S): 4; .5 INJECTION, POWDER, LYOPHILIZED, FOR SOLUTION INTRAVENOUS at 05:07

## 2019-02-21 RX ADMIN — HYDROMORPHONE HYDROCHLORIDE 0.5 MILLIGRAM(S): 2 INJECTION INTRAMUSCULAR; INTRAVENOUS; SUBCUTANEOUS at 15:07

## 2019-02-21 RX ADMIN — Medication 100 MILLIEQUIVALENT(S): at 14:21

## 2019-02-21 RX ADMIN — HYDROMORPHONE HYDROCHLORIDE 0.5 MILLIGRAM(S): 2 INJECTION INTRAMUSCULAR; INTRAVENOUS; SUBCUTANEOUS at 07:23

## 2019-02-21 RX ADMIN — SODIUM CHLORIDE 100 MILLILITER(S): 9 INJECTION, SOLUTION INTRAVENOUS at 21:29

## 2019-02-21 RX ADMIN — Medication 1000 MILLIGRAM(S): at 00:10

## 2019-02-21 RX ADMIN — ONDANSETRON 4 MILLIGRAM(S): 8 TABLET, FILM COATED ORAL at 21:35

## 2019-02-21 RX ADMIN — SODIUM CHLORIDE 100 MILLILITER(S): 9 INJECTION, SOLUTION INTRAVENOUS at 12:17

## 2019-02-21 RX ADMIN — HYDROMORPHONE HYDROCHLORIDE 0.5 MILLIGRAM(S): 2 INJECTION INTRAMUSCULAR; INTRAVENOUS; SUBCUTANEOUS at 01:32

## 2019-02-21 RX ADMIN — SODIUM CHLORIDE 100 MILLILITER(S): 9 INJECTION, SOLUTION INTRAVENOUS at 00:44

## 2019-02-21 RX ADMIN — Medication 100 MILLIEQUIVALENT(S): at 11:49

## 2019-02-21 RX ADMIN — HYDROMORPHONE HYDROCHLORIDE 0.5 MILLIGRAM(S): 2 INJECTION INTRAMUSCULAR; INTRAVENOUS; SUBCUTANEOUS at 01:50

## 2019-02-21 RX ADMIN — PIPERACILLIN AND TAZOBACTAM 25 GRAM(S): 4; .5 INJECTION, POWDER, LYOPHILIZED, FOR SOLUTION INTRAVENOUS at 15:02

## 2019-02-21 RX ADMIN — HYDROMORPHONE HYDROCHLORIDE 0.5 MILLIGRAM(S): 2 INJECTION INTRAMUSCULAR; INTRAVENOUS; SUBCUTANEOUS at 22:59

## 2019-02-21 RX ADMIN — Medication 100 MILLIEQUIVALENT(S): at 09:56

## 2019-02-21 RX ADMIN — PIPERACILLIN AND TAZOBACTAM 25 GRAM(S): 4; .5 INJECTION, POWDER, LYOPHILIZED, FOR SOLUTION INTRAVENOUS at 22:32

## 2019-02-21 RX ADMIN — HYDROMORPHONE HYDROCHLORIDE 0.5 MILLIGRAM(S): 2 INJECTION INTRAMUSCULAR; INTRAVENOUS; SUBCUTANEOUS at 23:29

## 2019-02-21 RX ADMIN — SODIUM CHLORIDE 100 MILLILITER(S): 9 INJECTION, SOLUTION INTRAVENOUS at 06:52

## 2019-02-21 RX ADMIN — ONDANSETRON 4 MILLIGRAM(S): 8 TABLET, FILM COATED ORAL at 08:21

## 2019-02-21 RX ADMIN — SODIUM CHLORIDE 100 MILLILITER(S): 9 INJECTION, SOLUTION INTRAVENOUS at 08:21

## 2019-02-21 RX ADMIN — HYDROMORPHONE HYDROCHLORIDE 0.5 MILLIGRAM(S): 2 INJECTION INTRAMUSCULAR; INTRAVENOUS; SUBCUTANEOUS at 14:21

## 2019-02-21 RX ADMIN — FAMOTIDINE 20 MILLIGRAM(S): 10 INJECTION INTRAVENOUS at 12:17

## 2019-02-21 RX ADMIN — HYDROMORPHONE HYDROCHLORIDE 0.5 MILLIGRAM(S): 2 INJECTION INTRAMUSCULAR; INTRAVENOUS; SUBCUTANEOUS at 06:53

## 2019-02-21 RX ADMIN — ONDANSETRON 4 MILLIGRAM(S): 8 TABLET, FILM COATED ORAL at 14:22

## 2019-02-21 RX ADMIN — ENOXAPARIN SODIUM 40 MILLIGRAM(S): 100 INJECTION SUBCUTANEOUS at 11:50

## 2019-02-21 NOTE — PROGRESS NOTE ADULT - SUBJECTIVE AND OBJECTIVE BOX
B Team Surgery Progress Note    SUBJECTIVE: Patient seen and examined at the bedside in the ED. Feeling well this morning. Has not had any nausea or vomiting since admission. Continues to have LLQ pain, relieved with pain medication Has passed flatus has not passed a bowel movement.     VITALS  T(C): 36.4 (02-21-19 @ 07:23), Max: 37.1 (02-20-19 @ 19:55)  HR: 65 (02-21-19 @ 07:23) (65 - 98)  BP: 119/66 (02-21-19 @ 07:23) (119/65 - 143/86)  RR: 14 (02-21-19 @ 07:23) (14 - 18)  SpO2: 97% (02-21-19 @ 07:23) (95% - 100%)    Is/Os    02-20 @ 07:01  -  02-21 @ 07:00  --------------------------------------------------------  IN:    lactated ringers.: 100 mL  Total IN: 100 mL    OUT:  Total OUT: 0 mL    Total NET: 100 mL    PHYSICAL EXAM:   General: NAD, Lying in bed comfortably, alert, oriented x3  Pulm: Non-labored breathing  GI/Abd: Softly distended, tender in LLQ, no rebound/guarding    MEDICATIONS (STANDING): enoxaparin Injectable 40 milliGRAM(s) SubCutaneous daily  famotidine  IVPB 20 milliGRAM(s) IV Intermittent daily  lactated ringers. 1000 milliLiter(s) IV Continuous <Continuous>  piperacillin/tazobactam IVPB. 3.375 Gram(s) IV Intermittent every 8 hours    MEDICATIONS (PRN):HYDROmorphone  Injectable 0.5 milliGRAM(s) IV Push every 4 hours PRN breakthrough pain  ondansetron Injectable 4 milliGRAM(s) IV Push every 6 hours PRN Nausea and/or Vomiting    LABS  CBC (02-21 @ 05:05)                              11.6                           9.00    )----------------(  218        --    % Neutrophils, --    % Lymphocytes, ANC: --                                  38.0    CBC (02-20 @ 18:35)                              11.1<L>                         10.40   )----------------(  219        68.0  % Neutrophils, 21.3  % Lymphocytes, ANC: 7.07                                35.3      BMP (02-21 @ 05:05)             139     |  102     |  8     		Ca++ --      Ca 8.6                ---------------------------------( 113<H>		Mg 2.1                3.2<L>  |  23      |  0.60  			Ph 2.8     BMP (02-20 @ 18:35)             138     |  100     |  11    		Ca++ --      Ca 8.6                ---------------------------------( 105<H>		Mg --                 3.1<L>  |  25      |  0.79  			Ph --        LFTs (02-20 @ 18:35)      TPro 6.9 / Alb 3.7 / TBili 0.4 / DBili -- / AST 25 / ALT 19 / AlkPhos 83    Coags (02-21 @ 05:05)  aPTT 34.9 / INR 1.03 / PT 11.5

## 2019-02-21 NOTE — PROGRESS NOTE ADULT - ASSESSMENT
ASSESSMENT  53F with diverticulitis and 1.7x3.8cm collection.     PLAN  - NPO IV fluids  - Zosyn  - IR consult for drainage  - Pain control as needed with serial abdominal exams  - DVT ppx  - Will transfer care to colorectal surgery    [id number]

## 2019-02-22 LAB
ANION GAP SERPL CALC-SCNC: 10 MMO/L — SIGNIFICANT CHANGE UP (ref 7–14)
BACTERIA UR CULT: SIGNIFICANT CHANGE UP
BUN SERPL-MCNC: 5 MG/DL — LOW (ref 7–23)
CALCIUM SERPL-MCNC: 8.6 MG/DL — SIGNIFICANT CHANGE UP (ref 8.4–10.5)
CHLORIDE SERPL-SCNC: 105 MMOL/L — SIGNIFICANT CHANGE UP (ref 98–107)
CO2 SERPL-SCNC: 26 MMOL/L — SIGNIFICANT CHANGE UP (ref 22–31)
CREAT SERPL-MCNC: 0.71 MG/DL — SIGNIFICANT CHANGE UP (ref 0.5–1.3)
GLUCOSE SERPL-MCNC: 116 MG/DL — HIGH (ref 70–99)
HCT VFR BLD CALC: 34.6 % — SIGNIFICANT CHANGE UP (ref 34.5–45)
HGB BLD-MCNC: 10.4 G/DL — LOW (ref 11.5–15.5)
MAGNESIUM SERPL-MCNC: 2.1 MG/DL — SIGNIFICANT CHANGE UP (ref 1.6–2.6)
MCHC RBC-ENTMCNC: 25.4 PG — LOW (ref 27–34)
MCHC RBC-ENTMCNC: 30.1 % — LOW (ref 32–36)
MCV RBC AUTO: 84.4 FL — SIGNIFICANT CHANGE UP (ref 80–100)
NRBC # FLD: 0 K/UL — LOW (ref 25–125)
PHOSPHATE SERPL-MCNC: 2.8 MG/DL — SIGNIFICANT CHANGE UP (ref 2.5–4.5)
PLATELET # BLD AUTO: 245 K/UL — SIGNIFICANT CHANGE UP (ref 150–400)
PMV BLD: 10.8 FL — SIGNIFICANT CHANGE UP (ref 7–13)
POTASSIUM SERPL-MCNC: 3.7 MMOL/L — SIGNIFICANT CHANGE UP (ref 3.5–5.3)
POTASSIUM SERPL-SCNC: 3.7 MMOL/L — SIGNIFICANT CHANGE UP (ref 3.5–5.3)
RBC # BLD: 4.1 M/UL — SIGNIFICANT CHANGE UP (ref 3.8–5.2)
RBC # FLD: 13.6 % — SIGNIFICANT CHANGE UP (ref 10.3–14.5)
SODIUM SERPL-SCNC: 141 MMOL/L — SIGNIFICANT CHANGE UP (ref 135–145)
SPECIMEN SOURCE: SIGNIFICANT CHANGE UP
WBC # BLD: 7.08 K/UL — SIGNIFICANT CHANGE UP (ref 3.8–10.5)
WBC # FLD AUTO: 7.08 K/UL — SIGNIFICANT CHANGE UP (ref 3.8–10.5)

## 2019-02-22 PROCEDURE — 99232 SBSQ HOSP IP/OBS MODERATE 35: CPT

## 2019-02-22 RX ORDER — POTASSIUM CHLORIDE 20 MEQ
10 PACKET (EA) ORAL ONCE
Qty: 0 | Refills: 0 | Status: COMPLETED | OUTPATIENT
Start: 2019-02-22 | End: 2019-02-22

## 2019-02-22 RX ADMIN — ONDANSETRON 4 MILLIGRAM(S): 8 TABLET, FILM COATED ORAL at 17:59

## 2019-02-22 RX ADMIN — FAMOTIDINE 20 MILLIGRAM(S): 10 INJECTION INTRAVENOUS at 11:27

## 2019-02-22 RX ADMIN — ONDANSETRON 4 MILLIGRAM(S): 8 TABLET, FILM COATED ORAL at 13:15

## 2019-02-22 RX ADMIN — PIPERACILLIN AND TAZOBACTAM 25 GRAM(S): 4; .5 INJECTION, POWDER, LYOPHILIZED, FOR SOLUTION INTRAVENOUS at 21:09

## 2019-02-22 RX ADMIN — PIPERACILLIN AND TAZOBACTAM 25 GRAM(S): 4; .5 INJECTION, POWDER, LYOPHILIZED, FOR SOLUTION INTRAVENOUS at 05:25

## 2019-02-22 RX ADMIN — HYDROMORPHONE HYDROCHLORIDE 0.5 MILLIGRAM(S): 2 INJECTION INTRAMUSCULAR; INTRAVENOUS; SUBCUTANEOUS at 05:28

## 2019-02-22 RX ADMIN — ENOXAPARIN SODIUM 40 MILLIGRAM(S): 100 INJECTION SUBCUTANEOUS at 11:27

## 2019-02-22 RX ADMIN — Medication 100 MILLIEQUIVALENT(S): at 16:26

## 2019-02-22 RX ADMIN — HYDROMORPHONE HYDROCHLORIDE 0.5 MILLIGRAM(S): 2 INJECTION INTRAMUSCULAR; INTRAVENOUS; SUBCUTANEOUS at 05:58

## 2019-02-22 RX ADMIN — HYDROMORPHONE HYDROCHLORIDE 0.5 MILLIGRAM(S): 2 INJECTION INTRAMUSCULAR; INTRAVENOUS; SUBCUTANEOUS at 13:15

## 2019-02-22 RX ADMIN — PIPERACILLIN AND TAZOBACTAM 25 GRAM(S): 4; .5 INJECTION, POWDER, LYOPHILIZED, FOR SOLUTION INTRAVENOUS at 13:16

## 2019-02-22 RX ADMIN — HYDROMORPHONE HYDROCHLORIDE 0.5 MILLIGRAM(S): 2 INJECTION INTRAMUSCULAR; INTRAVENOUS; SUBCUTANEOUS at 14:00

## 2019-02-22 NOTE — PROGRESS NOTE ADULT - ASSESSMENT
53F with diverticulitis and 1.7x3.8cm collection.     PLAN  - NPO IV fluids  - Zosyn  - Pain control as needed with serial abdominal exams  - DVT ppx

## 2019-02-22 NOTE — PROGRESS NOTE ADULT - SUBJECTIVE AND OBJECTIVE BOX
pt was seen and examined this morning. Still having left quadrant pain. passing gas but did not have a bowel movement.       Vital Signs Last 24 Hrs  T(C): 36.7 (2019 05:23), Max: 36.7 (2019 17:43)  T(F): 98.1 (2019 05:23), Max: 98.1 (2019 05:23)  HR: 73 (2019 05:58) (70 - 81)  BP: 130/68 (2019 05:58) (125/66 - 143/91)  BP(mean): --  RR: 18 (2019 05:58) (16 - 18)  SpO2: 100% (2019 05:58) (95% - 100%)    I&O's Detail    2019 07:01  -  2019 07:00  --------------------------------------------------------  IN:    dextrose 5% + sodium chloride 0.45%: 1600 mL    IV PiggyBack: 200 mL  Total IN: 1800 mL    OUT:    Voided: 2200 mL  Total OUT: 2200 mL    Total NET: -400 mL          MEDICATIONS  (STANDING):  dextrose 5% + sodium chloride 0.45% 1000 milliLiter(s) (100 mL/Hr) IV Continuous <Continuous>  enoxaparin Injectable 40 milliGRAM(s) SubCutaneous daily  famotidine Injectable 20 milliGRAM(s) IV Push daily  piperacillin/tazobactam IVPB. 3.375 Gram(s) IV Intermittent every 8 hours    MEDICATIONS  (PRN):  HYDROmorphone  Injectable 0.5 milliGRAM(s) IV Push every 4 hours PRN breakthrough pain  ondansetron Injectable 4 milliGRAM(s) IV Push every 6 hours PRN Nausea and/or Vomiting      LABS:                        10.4   7.08  )-----------( 245      ( 2019 05:30 )             34.6     02-22    141  |  105  |  5<L>  ----------------------------<  116<H>  3.7   |  26  |  0.71    Ca    8.6      2019 05:30  Phos  2.8     02-  Mg     2.1     -    TPro  6.9  /  Alb  3.7  /  TBili  0.4  /  DBili  x   /  AST  25  /  ALT  19  /  AlkPhos  83  02-20    PT/INR - ( 2019 05:05 )   PT: 11.5 SEC;   INR: 1.03          PTT - ( 2019 05:05 )  PTT:34.9 SEC  Urinalysis Basic - ( 2019 18:35 )    Color: LIGHT YELLOW / Appearance: CLEAR / S.012 / pH: 6.5  Gluc: NEGATIVE / Ketone: NEGATIVE  / Bili: NEGATIVE / Urobili: NORMAL   Blood: SMALL / Protein: 10 / Nitrite: NEGATIVE   Leuk Esterase: NEGATIVE / RBC: 0-2 / WBC 6-10   Sq Epi: FEW / Non Sq Epi: x / Bacteria: NEGATIVE      LIVER FUNCTIONS - ( 2019 18:35 )  Alb: 3.7 g/dL / Pro: 6.9 g/dL / ALK PHOS: 83 u/L / ALT: 19 u/L / AST: 25 u/L / GGT: x               physical exam   no acute distress  resp: non labored   abdomen: soft, non distended. LLQ tenderness

## 2019-02-23 LAB
ANION GAP SERPL CALC-SCNC: 12 MMO/L — SIGNIFICANT CHANGE UP (ref 7–14)
BUN SERPL-MCNC: 4 MG/DL — LOW (ref 7–23)
CALCIUM SERPL-MCNC: 8.9 MG/DL — SIGNIFICANT CHANGE UP (ref 8.4–10.5)
CHLORIDE SERPL-SCNC: 103 MMOL/L — SIGNIFICANT CHANGE UP (ref 98–107)
CO2 SERPL-SCNC: 24 MMOL/L — SIGNIFICANT CHANGE UP (ref 22–31)
CREAT SERPL-MCNC: 0.7 MG/DL — SIGNIFICANT CHANGE UP (ref 0.5–1.3)
GLUCOSE SERPL-MCNC: 119 MG/DL — HIGH (ref 70–99)
HCT VFR BLD CALC: 34.7 % — SIGNIFICANT CHANGE UP (ref 34.5–45)
HGB BLD-MCNC: 10.9 G/DL — LOW (ref 11.5–15.5)
MAGNESIUM SERPL-MCNC: 2.1 MG/DL — SIGNIFICANT CHANGE UP (ref 1.6–2.6)
MCHC RBC-ENTMCNC: 25.8 PG — LOW (ref 27–34)
MCHC RBC-ENTMCNC: 31.4 % — LOW (ref 32–36)
MCV RBC AUTO: 82 FL — SIGNIFICANT CHANGE UP (ref 80–100)
NRBC # FLD: 0 K/UL — LOW (ref 25–125)
PHOSPHATE SERPL-MCNC: 2.8 MG/DL — SIGNIFICANT CHANGE UP (ref 2.5–4.5)
PLATELET # BLD AUTO: 263 K/UL — SIGNIFICANT CHANGE UP (ref 150–400)
PMV BLD: 10.9 FL — SIGNIFICANT CHANGE UP (ref 7–13)
POTASSIUM SERPL-MCNC: 3.9 MMOL/L — SIGNIFICANT CHANGE UP (ref 3.5–5.3)
POTASSIUM SERPL-SCNC: 3.9 MMOL/L — SIGNIFICANT CHANGE UP (ref 3.5–5.3)
RBC # BLD: 4.23 M/UL — SIGNIFICANT CHANGE UP (ref 3.8–5.2)
RBC # FLD: 13.5 % — SIGNIFICANT CHANGE UP (ref 10.3–14.5)
SODIUM SERPL-SCNC: 139 MMOL/L — SIGNIFICANT CHANGE UP (ref 135–145)
WBC # BLD: 5.49 K/UL — SIGNIFICANT CHANGE UP (ref 3.8–10.5)
WBC # FLD AUTO: 5.49 K/UL — SIGNIFICANT CHANGE UP (ref 3.8–10.5)

## 2019-02-23 RX ADMIN — ENOXAPARIN SODIUM 40 MILLIGRAM(S): 100 INJECTION SUBCUTANEOUS at 12:06

## 2019-02-23 RX ADMIN — ONDANSETRON 4 MILLIGRAM(S): 8 TABLET, FILM COATED ORAL at 06:57

## 2019-02-23 RX ADMIN — PIPERACILLIN AND TAZOBACTAM 25 GRAM(S): 4; .5 INJECTION, POWDER, LYOPHILIZED, FOR SOLUTION INTRAVENOUS at 13:56

## 2019-02-23 RX ADMIN — PIPERACILLIN AND TAZOBACTAM 25 GRAM(S): 4; .5 INJECTION, POWDER, LYOPHILIZED, FOR SOLUTION INTRAVENOUS at 22:09

## 2019-02-23 RX ADMIN — FAMOTIDINE 20 MILLIGRAM(S): 10 INJECTION INTRAVENOUS at 12:07

## 2019-02-23 RX ADMIN — SODIUM CHLORIDE 100 MILLILITER(S): 9 INJECTION, SOLUTION INTRAVENOUS at 18:27

## 2019-02-23 RX ADMIN — HYDROMORPHONE HYDROCHLORIDE 0.5 MILLIGRAM(S): 2 INJECTION INTRAMUSCULAR; INTRAVENOUS; SUBCUTANEOUS at 18:26

## 2019-02-23 RX ADMIN — SODIUM CHLORIDE 100 MILLILITER(S): 9 INJECTION, SOLUTION INTRAVENOUS at 12:07

## 2019-02-23 RX ADMIN — PIPERACILLIN AND TAZOBACTAM 25 GRAM(S): 4; .5 INJECTION, POWDER, LYOPHILIZED, FOR SOLUTION INTRAVENOUS at 06:57

## 2019-02-23 RX ADMIN — ONDANSETRON 4 MILLIGRAM(S): 8 TABLET, FILM COATED ORAL at 18:25

## 2019-02-23 RX ADMIN — HYDROMORPHONE HYDROCHLORIDE 0.5 MILLIGRAM(S): 2 INJECTION INTRAMUSCULAR; INTRAVENOUS; SUBCUTANEOUS at 07:27

## 2019-02-23 RX ADMIN — HYDROMORPHONE HYDROCHLORIDE 0.5 MILLIGRAM(S): 2 INJECTION INTRAMUSCULAR; INTRAVENOUS; SUBCUTANEOUS at 06:57

## 2019-02-23 RX ADMIN — HYDROMORPHONE HYDROCHLORIDE 0.5 MILLIGRAM(S): 2 INJECTION INTRAMUSCULAR; INTRAVENOUS; SUBCUTANEOUS at 18:50

## 2019-02-23 NOTE — PROGRESS NOTE ADULT - SUBJECTIVE AND OBJECTIVE BOX
pt was seen and examined this morning. Still having left quadrant pain. passing gas but did not have a bowel movement.     Vital Signs (24 Hrs):  T(C): 37.2 (02-23-19 @ 06:00), Max: 37.2 (02-23-19 @ 06:00)  HR: 68 (02-23-19 @ 06:00) (62 - 71)  BP: 139/69 (02-23-19 @ 06:00) (129/74 - 144/80)  RR: 17 (02-23-19 @ 06:00) (16 - 18)  SpO2: 100% (02-23-19 @ 06:00) (100% - 100%)  Wt(kg): --  Daily     Daily     I&O's Summary    22 Feb 2019 07:01  -  23 Feb 2019 07:00  --------------------------------------------------------  IN: 2500 mL / OUT: 1700 mL / NET: 800 mL      physical exam   no acute distress  resp: non labored   abdomen: soft, non distended. LLQ tenderness                           10.9   5.49  )-----------( 263      ( 23 Feb 2019 05:44 )             34.7       02-23    139  |  103  |  4<L>  ----------------------------<  119<H>  3.9   |  24  |  0.70    Ca    8.9      23 Feb 2019 05:44  Phos  2.8     02-23  Mg     2.1     02-23

## 2019-02-23 NOTE — PROGRESS NOTE ADULT - ASSESSMENT
53F with Perforated diverticulitis and 1.7x3.8cm collection.   - Possible advance diet  -OOB  -IV abx for 5 full days.  DC no earlier then Monday  -dvt ppx  -Repeat imaging on day 5 if worsening pain

## 2019-02-24 LAB
ANION GAP SERPL CALC-SCNC: 10 MMO/L — SIGNIFICANT CHANGE UP (ref 7–14)
BUN SERPL-MCNC: 4 MG/DL — LOW (ref 7–23)
CALCIUM SERPL-MCNC: 8.9 MG/DL — SIGNIFICANT CHANGE UP (ref 8.4–10.5)
CHLORIDE SERPL-SCNC: 103 MMOL/L — SIGNIFICANT CHANGE UP (ref 98–107)
CO2 SERPL-SCNC: 25 MMOL/L — SIGNIFICANT CHANGE UP (ref 22–31)
CREAT SERPL-MCNC: 0.83 MG/DL — SIGNIFICANT CHANGE UP (ref 0.5–1.3)
GLUCOSE SERPL-MCNC: 118 MG/DL — HIGH (ref 70–99)
HCT VFR BLD CALC: 37.4 % — SIGNIFICANT CHANGE UP (ref 34.5–45)
HGB BLD-MCNC: 11.8 G/DL — SIGNIFICANT CHANGE UP (ref 11.5–15.5)
MAGNESIUM SERPL-MCNC: 2 MG/DL — SIGNIFICANT CHANGE UP (ref 1.6–2.6)
MCHC RBC-ENTMCNC: 25.9 PG — LOW (ref 27–34)
MCHC RBC-ENTMCNC: 31.6 % — LOW (ref 32–36)
MCV RBC AUTO: 82.2 FL — SIGNIFICANT CHANGE UP (ref 80–100)
NRBC # FLD: 0 K/UL — LOW (ref 25–125)
PHOSPHATE SERPL-MCNC: 3.5 MG/DL — SIGNIFICANT CHANGE UP (ref 2.5–4.5)
PLATELET # BLD AUTO: 276 K/UL — SIGNIFICANT CHANGE UP (ref 150–400)
PMV BLD: 10.3 FL — SIGNIFICANT CHANGE UP (ref 7–13)
POTASSIUM SERPL-MCNC: 3.9 MMOL/L — SIGNIFICANT CHANGE UP (ref 3.5–5.3)
POTASSIUM SERPL-SCNC: 3.9 MMOL/L — SIGNIFICANT CHANGE UP (ref 3.5–5.3)
RBC # BLD: 4.55 M/UL — SIGNIFICANT CHANGE UP (ref 3.8–5.2)
RBC # FLD: 13.3 % — SIGNIFICANT CHANGE UP (ref 10.3–14.5)
SODIUM SERPL-SCNC: 138 MMOL/L — SIGNIFICANT CHANGE UP (ref 135–145)
WBC # BLD: 5.13 K/UL — SIGNIFICANT CHANGE UP (ref 3.8–10.5)
WBC # FLD AUTO: 5.13 K/UL — SIGNIFICANT CHANGE UP (ref 3.8–10.5)

## 2019-02-24 RX ADMIN — ONDANSETRON 4 MILLIGRAM(S): 8 TABLET, FILM COATED ORAL at 18:41

## 2019-02-24 RX ADMIN — PIPERACILLIN AND TAZOBACTAM 25 GRAM(S): 4; .5 INJECTION, POWDER, LYOPHILIZED, FOR SOLUTION INTRAVENOUS at 06:36

## 2019-02-24 RX ADMIN — PIPERACILLIN AND TAZOBACTAM 25 GRAM(S): 4; .5 INJECTION, POWDER, LYOPHILIZED, FOR SOLUTION INTRAVENOUS at 14:37

## 2019-02-24 RX ADMIN — HYDROMORPHONE HYDROCHLORIDE 0.5 MILLIGRAM(S): 2 INJECTION INTRAMUSCULAR; INTRAVENOUS; SUBCUTANEOUS at 18:27

## 2019-02-24 RX ADMIN — ONDANSETRON 4 MILLIGRAM(S): 8 TABLET, FILM COATED ORAL at 13:45

## 2019-02-24 RX ADMIN — HYDROMORPHONE HYDROCHLORIDE 0.5 MILLIGRAM(S): 2 INJECTION INTRAMUSCULAR; INTRAVENOUS; SUBCUTANEOUS at 19:00

## 2019-02-24 RX ADMIN — ENOXAPARIN SODIUM 40 MILLIGRAM(S): 100 INJECTION SUBCUTANEOUS at 13:45

## 2019-02-24 RX ADMIN — PIPERACILLIN AND TAZOBACTAM 25 GRAM(S): 4; .5 INJECTION, POWDER, LYOPHILIZED, FOR SOLUTION INTRAVENOUS at 21:05

## 2019-02-24 RX ADMIN — FAMOTIDINE 20 MILLIGRAM(S): 10 INJECTION INTRAVENOUS at 13:45

## 2019-02-24 NOTE — PROGRESS NOTE ADULT - ASSESSMENT
53F with Perforated diverticulitis and 1.7x3.8cm collection.   -CLD  -OOB  -IV abx for 5 full days.    -dvt ppx

## 2019-02-24 NOTE — PROGRESS NOTE ADULT - SUBJECTIVE AND OBJECTIVE BOX
pt was seen and examined this morning. pain controlled with medication . passing gas       Vital Signs Last 24 Hrs  T(C): 37.1 (24 Feb 2019 09:38), Max: 37.2 (23 Feb 2019 13:56)  T(F): 98.7 (24 Feb 2019 09:38), Max: 98.9 (23 Feb 2019 13:56)  HR: 101 (24 Feb 2019 09:38) (63 - 101)  BP: 115/45 (24 Feb 2019 09:38) (115/45 - 148/77)  BP(mean): --  RR: 17 (24 Feb 2019 09:38) (17 - 18)  SpO2: 99% (24 Feb 2019 09:38) (95% - 99%)    I&O's Detail    23 Feb 2019 07:01  -  24 Feb 2019 07:00  --------------------------------------------------------  IN:    dextrose 5% + sodium chloride 0.45%: 2300 mL    IV PiggyBack: 200 mL  Total IN: 2500 mL    OUT:    Voided: 1950 mL  Total OUT: 1950 mL    Total NET: 550 mL      24 Feb 2019 07:01  -  24 Feb 2019 10:32  --------------------------------------------------------  IN:  Total IN: 0 mL    OUT:  Total OUT: 0 mL    Total NET: 0 mL          MEDICATIONS  (STANDING):  dextrose 5% + sodium chloride 0.45% 1000 milliLiter(s) (100 mL/Hr) IV Continuous <Continuous>  enoxaparin Injectable 40 milliGRAM(s) SubCutaneous daily  famotidine Injectable 20 milliGRAM(s) IV Push daily  piperacillin/tazobactam IVPB. 3.375 Gram(s) IV Intermittent every 8 hours    MEDICATIONS  (PRN):  HYDROmorphone  Injectable 0.5 milliGRAM(s) IV Push every 4 hours PRN breakthrough pain  ondansetron Injectable 4 milliGRAM(s) IV Push every 6 hours PRN Nausea and/or Vomiting      LABS:                        11.8   5.13  )-----------( 276      ( 24 Feb 2019 05:31 )             37.4     02-24    138  |  103  |  4<L>  ----------------------------<  118<H>  3.9   |  25  |  0.83    Ca    8.9      24 Feb 2019 05:31  Phos  3.5     02-24  Mg     2.0     02-24                    physical exam   no acute distress  resp: non labored   abdomen: soft, non distended. LLQ tenderness

## 2019-02-24 NOTE — PROGRESS NOTE ADULT - ATTENDING COMMENTS
Perforated diverticulitis  -NPO as pt still with pain  -OOB  -IV abx for 5 full days.  DC no earlier then Monday  -dvt ppx  -Repeat imaging on day 5 if worsening pain
clears  serial abd exams
still with pain    aaox3  abd soft, tender BLQ, L>R, no gross peritonitis    Diverticulitis  sips of water, otherwise NPO  iv abx  ct monday if not improved

## 2019-02-25 LAB
ANION GAP SERPL CALC-SCNC: 13 MMO/L — SIGNIFICANT CHANGE UP (ref 7–14)
BUN SERPL-MCNC: 4 MG/DL — LOW (ref 7–23)
CALCIUM SERPL-MCNC: 8.9 MG/DL — SIGNIFICANT CHANGE UP (ref 8.4–10.5)
CHLORIDE SERPL-SCNC: 101 MMOL/L — SIGNIFICANT CHANGE UP (ref 98–107)
CO2 SERPL-SCNC: 24 MMOL/L — SIGNIFICANT CHANGE UP (ref 22–31)
CREAT SERPL-MCNC: 0.86 MG/DL — SIGNIFICANT CHANGE UP (ref 0.5–1.3)
GLUCOSE SERPL-MCNC: 116 MG/DL — HIGH (ref 70–99)
HCT VFR BLD CALC: 38.9 % — SIGNIFICANT CHANGE UP (ref 34.5–45)
HGB BLD-MCNC: 11.8 G/DL — SIGNIFICANT CHANGE UP (ref 11.5–15.5)
MAGNESIUM SERPL-MCNC: 2 MG/DL — SIGNIFICANT CHANGE UP (ref 1.6–2.6)
MCHC RBC-ENTMCNC: 25.1 PG — LOW (ref 27–34)
MCHC RBC-ENTMCNC: 30.3 % — LOW (ref 32–36)
MCV RBC AUTO: 82.8 FL — SIGNIFICANT CHANGE UP (ref 80–100)
NRBC # FLD: 0 K/UL — LOW (ref 25–125)
PHOSPHATE SERPL-MCNC: 3.6 MG/DL — SIGNIFICANT CHANGE UP (ref 2.5–4.5)
PLATELET # BLD AUTO: 292 K/UL — SIGNIFICANT CHANGE UP (ref 150–400)
PMV BLD: 10.6 FL — SIGNIFICANT CHANGE UP (ref 7–13)
POTASSIUM SERPL-MCNC: 3.9 MMOL/L — SIGNIFICANT CHANGE UP (ref 3.5–5.3)
POTASSIUM SERPL-SCNC: 3.9 MMOL/L — SIGNIFICANT CHANGE UP (ref 3.5–5.3)
RBC # BLD: 4.7 M/UL — SIGNIFICANT CHANGE UP (ref 3.8–5.2)
RBC # FLD: 13.4 % — SIGNIFICANT CHANGE UP (ref 10.3–14.5)
SODIUM SERPL-SCNC: 138 MMOL/L — SIGNIFICANT CHANGE UP (ref 135–145)
WBC # BLD: 5.59 K/UL — SIGNIFICANT CHANGE UP (ref 3.8–10.5)
WBC # FLD AUTO: 5.59 K/UL — SIGNIFICANT CHANGE UP (ref 3.8–10.5)

## 2019-02-25 PROCEDURE — 99232 SBSQ HOSP IP/OBS MODERATE 35: CPT

## 2019-02-25 RX ADMIN — FAMOTIDINE 20 MILLIGRAM(S): 10 INJECTION INTRAVENOUS at 11:47

## 2019-02-25 RX ADMIN — ONDANSETRON 4 MILLIGRAM(S): 8 TABLET, FILM COATED ORAL at 16:05

## 2019-02-25 RX ADMIN — HYDROMORPHONE HYDROCHLORIDE 0.5 MILLIGRAM(S): 2 INJECTION INTRAMUSCULAR; INTRAVENOUS; SUBCUTANEOUS at 16:06

## 2019-02-25 RX ADMIN — PIPERACILLIN AND TAZOBACTAM 25 GRAM(S): 4; .5 INJECTION, POWDER, LYOPHILIZED, FOR SOLUTION INTRAVENOUS at 06:12

## 2019-02-25 RX ADMIN — SODIUM CHLORIDE 50 MILLILITER(S): 9 INJECTION, SOLUTION INTRAVENOUS at 16:57

## 2019-02-25 RX ADMIN — SODIUM CHLORIDE 100 MILLILITER(S): 9 INJECTION, SOLUTION INTRAVENOUS at 11:47

## 2019-02-25 RX ADMIN — ENOXAPARIN SODIUM 40 MILLIGRAM(S): 100 INJECTION SUBCUTANEOUS at 11:47

## 2019-02-25 RX ADMIN — HYDROMORPHONE HYDROCHLORIDE 0.5 MILLIGRAM(S): 2 INJECTION INTRAMUSCULAR; INTRAVENOUS; SUBCUTANEOUS at 16:35

## 2019-02-25 RX ADMIN — PIPERACILLIN AND TAZOBACTAM 25 GRAM(S): 4; .5 INJECTION, POWDER, LYOPHILIZED, FOR SOLUTION INTRAVENOUS at 14:19

## 2019-02-25 RX ADMIN — PIPERACILLIN AND TAZOBACTAM 25 GRAM(S): 4; .5 INJECTION, POWDER, LYOPHILIZED, FOR SOLUTION INTRAVENOUS at 22:17

## 2019-02-25 NOTE — PROGRESS NOTE ADULT - SUBJECTIVE AND OBJECTIVE BOX
Morning Surgical Progress Note  Patient is a 53y old  Female who presents with a chief complaint of     SUBJECTIVE: Patient seen and examined at bedside with surgical team, patient complained of nausea with jello and abdominal pain with broth overnight. She still feels nauseated. She denies abdominal pain this am.    Vital Signs Last 24 Hrs  T(C): 36.7 (25 Feb 2019 06:10), Max: 37.1 (24 Feb 2019 09:38)  T(F): 98 (25 Feb 2019 06:10), Max: 98.7 (24 Feb 2019 09:38)  HR: 59 (25 Feb 2019 06:10) (59 - 101)  BP: 145/71 (25 Feb 2019 06:10) (115/45 - 146/76)  BP(mean): --  RR: 17 (25 Feb 2019 06:10) (16 - 18)  SpO2: 98% (25 Feb 2019 06:10) (97% - 99%)I&O's Detail    24 Feb 2019 07:01  -  25 Feb 2019 07:00  --------------------------------------------------------  IN:    dextrose 5% + sodium chloride 0.45%: 1200 mL    IV PiggyBack: 200 mL    Oral Fluid: 50 mL  Total IN: 1450 mL    OUT:    Voided: 1620 mL  Total OUT: 1620 mL    Total NET: -170 mL      MEDICATIONS  (STANDING):  dextrose 5% + sodium chloride 0.45% 1000 milliLiter(s) (100 mL/Hr) IV Continuous <Continuous>  enoxaparin Injectable 40 milliGRAM(s) SubCutaneous daily  famotidine Injectable 20 milliGRAM(s) IV Push daily  piperacillin/tazobactam IVPB. 3.375 Gram(s) IV Intermittent every 8 hours    MEDICATIONS  (PRN):  HYDROmorphone  Injectable 0.5 milliGRAM(s) IV Push every 4 hours PRN breakthrough pain  ondansetron Injectable 4 milliGRAM(s) IV Push every 6 hours PRN Nausea and/or Vomiting      Physical Exam  Constitutional: A&Ox3, NAD  Respiratory: nonlabored breathing  Gastrointestinal: soft nontender nondistended    LABS:                        11.8   5.59  )-----------( 292      ( 25 Feb 2019 05:30 )             38.9     02-25    138  |  101  |  4<L>  ----------------------------<  116<H>  3.9   |  24  |  0.86    Ca    8.9      25 Feb 2019 05:30  Phos  3.6     02-25  Mg     2.0     02-25      CT: < from: CT Abdomen and Pelvis w/ IV Cont (02.20.19 @ 19:39) >  IMPRESSION:   Acute diverticulitis of the distal descending colon with an associated   small air-containing collection.    < end of copied text >

## 2019-02-25 NOTE — PROGRESS NOTE ADULT - ASSESSMENT
53F with Perforated diverticulitis and 1.7x3.8cm collection had nausea and pain overnight with clears, currently asymptomatic   -continue clears  -OOB/ambulate  -IV abx for 5 full days, today day 4  -dvt ppx  -discussed with a team

## 2019-02-26 LAB
ANION GAP SERPL CALC-SCNC: 12 MMO/L — SIGNIFICANT CHANGE UP (ref 7–14)
BUN SERPL-MCNC: 4 MG/DL — LOW (ref 7–23)
CALCIUM SERPL-MCNC: 9.1 MG/DL — SIGNIFICANT CHANGE UP (ref 8.4–10.5)
CHLORIDE SERPL-SCNC: 102 MMOL/L — SIGNIFICANT CHANGE UP (ref 98–107)
CO2 SERPL-SCNC: 25 MMOL/L — SIGNIFICANT CHANGE UP (ref 22–31)
CREAT SERPL-MCNC: 0.94 MG/DL — SIGNIFICANT CHANGE UP (ref 0.5–1.3)
GLUCOSE SERPL-MCNC: 103 MG/DL — HIGH (ref 70–99)
HCT VFR BLD CALC: 40.1 % — SIGNIFICANT CHANGE UP (ref 34.5–45)
HGB BLD-MCNC: 12.2 G/DL — SIGNIFICANT CHANGE UP (ref 11.5–15.5)
MAGNESIUM SERPL-MCNC: 1.9 MG/DL — SIGNIFICANT CHANGE UP (ref 1.6–2.6)
MCHC RBC-ENTMCNC: 25.6 PG — LOW (ref 27–34)
MCHC RBC-ENTMCNC: 30.4 % — LOW (ref 32–36)
MCV RBC AUTO: 84.2 FL — SIGNIFICANT CHANGE UP (ref 80–100)
NRBC # FLD: 0 K/UL — LOW (ref 25–125)
PHOSPHATE SERPL-MCNC: 3.6 MG/DL — SIGNIFICANT CHANGE UP (ref 2.5–4.5)
PLATELET # BLD AUTO: 299 K/UL — SIGNIFICANT CHANGE UP (ref 150–400)
PMV BLD: 10.3 FL — SIGNIFICANT CHANGE UP (ref 7–13)
POTASSIUM SERPL-MCNC: 3.8 MMOL/L — SIGNIFICANT CHANGE UP (ref 3.5–5.3)
POTASSIUM SERPL-SCNC: 3.8 MMOL/L — SIGNIFICANT CHANGE UP (ref 3.5–5.3)
RBC # BLD: 4.76 M/UL — SIGNIFICANT CHANGE UP (ref 3.8–5.2)
RBC # FLD: 13.3 % — SIGNIFICANT CHANGE UP (ref 10.3–14.5)
SODIUM SERPL-SCNC: 139 MMOL/L — SIGNIFICANT CHANGE UP (ref 135–145)
WBC # BLD: 5.61 K/UL — SIGNIFICANT CHANGE UP (ref 3.8–10.5)
WBC # FLD AUTO: 5.61 K/UL — SIGNIFICANT CHANGE UP (ref 3.8–10.5)

## 2019-02-26 PROCEDURE — 74177 CT ABD & PELVIS W/CONTRAST: CPT | Mod: 26

## 2019-02-26 PROCEDURE — 99232 SBSQ HOSP IP/OBS MODERATE 35: CPT

## 2019-02-26 RX ORDER — OXYCODONE HYDROCHLORIDE 5 MG/1
5 TABLET ORAL EVERY 4 HOURS
Qty: 0 | Refills: 0 | Status: DISCONTINUED | OUTPATIENT
Start: 2019-02-26 | End: 2019-02-27

## 2019-02-26 RX ORDER — ACETAMINOPHEN 500 MG
650 TABLET ORAL EVERY 6 HOURS
Qty: 0 | Refills: 0 | Status: DISCONTINUED | OUTPATIENT
Start: 2019-02-26 | End: 2019-02-27

## 2019-02-26 RX ADMIN — PIPERACILLIN AND TAZOBACTAM 25 GRAM(S): 4; .5 INJECTION, POWDER, LYOPHILIZED, FOR SOLUTION INTRAVENOUS at 05:42

## 2019-02-26 RX ADMIN — FAMOTIDINE 20 MILLIGRAM(S): 10 INJECTION INTRAVENOUS at 12:42

## 2019-02-26 RX ADMIN — PIPERACILLIN AND TAZOBACTAM 25 GRAM(S): 4; .5 INJECTION, POWDER, LYOPHILIZED, FOR SOLUTION INTRAVENOUS at 21:21

## 2019-02-26 RX ADMIN — ENOXAPARIN SODIUM 40 MILLIGRAM(S): 100 INJECTION SUBCUTANEOUS at 12:42

## 2019-02-26 RX ADMIN — OXYCODONE HYDROCHLORIDE 5 MILLIGRAM(S): 5 TABLET ORAL at 18:58

## 2019-02-26 RX ADMIN — SODIUM CHLORIDE 50 MILLILITER(S): 9 INJECTION, SOLUTION INTRAVENOUS at 11:03

## 2019-02-26 RX ADMIN — OXYCODONE HYDROCHLORIDE 5 MILLIGRAM(S): 5 TABLET ORAL at 19:30

## 2019-02-26 RX ADMIN — PIPERACILLIN AND TAZOBACTAM 25 GRAM(S): 4; .5 INJECTION, POWDER, LYOPHILIZED, FOR SOLUTION INTRAVENOUS at 13:27

## 2019-02-26 NOTE — PROGRESS NOTE ADULT - SUBJECTIVE AND OBJECTIVE BOX
Morning Surgical Progress Note  Patient is a 53y old  Female who presents with a chief complaint of     SUBJECTIVE: Patient seen and examined, denies abdominal pain.     Vital Signs Last 24 Hrs  T(C): 36.7 (26 Feb 2019 09:27), Max: 36.9 (26 Feb 2019 05:37)  T(F): 98.1 (26 Feb 2019 09:27), Max: 98.4 (26 Feb 2019 05:37)  HR: 65 (26 Feb 2019 09:27) (63 - 77)  BP: 123/72 (26 Feb 2019 09:27) (123/72 - 141/78)  BP(mean): --  RR: 18 (26 Feb 2019 09:27) (16 - 20)  SpO2: 99% (26 Feb 2019 09:27) (96% - 100%)    I&O's Detail    25 Feb 2019 07:01  -  26 Feb 2019 07:00  --------------------------------------------------------  IN:    dextrose 5% + sodium chloride 0.45%: 1600 mL    IV PiggyBack: 400 mL  Total IN: 2000 mL    OUT:    Voided: 1770 mL  Total OUT: 1770 mL    Total NET: 230 mL      26 Feb 2019 07:01  -  26 Feb 2019 10:04  --------------------------------------------------------  IN:  Total IN: 0 mL    OUT:    Voided: 500 mL  Total OUT: 500 mL    Total NET: -500 mL          MEDICATIONS  (STANDING):  dextrose 5% + sodium chloride 0.45% 1000 milliLiter(s) (50 mL/Hr) IV Continuous <Continuous>  enoxaparin Injectable 40 milliGRAM(s) SubCutaneous daily  famotidine Injectable 20 milliGRAM(s) IV Push daily  piperacillin/tazobactam IVPB. 3.375 Gram(s) IV Intermittent every 8 hours    MEDICATIONS  (PRN):  HYDROmorphone  Injectable 0.5 milliGRAM(s) IV Push every 4 hours PRN breakthrough pain  ondansetron Injectable 4 milliGRAM(s) IV Push every 6 hours PRN Nausea and/or Vomiting      LABS:                        12.2   5.61  )-----------( 299      ( 26 Feb 2019 05:39 )             40.1     02-26    139  |  102  |  4<L>  ----------------------------<  103<H>  3.8   |  25  |  0.94    Ca    9.1      26 Feb 2019 05:39  Phos  3.6     02-26  Mg     1.9     02-26                    Physical Exam  Constitutional: A&Ox3, NAD  Respiratory: nonlabored breathing  Gastrointestinal: soft nontender nondistended

## 2019-02-26 NOTE — PROGRESS NOTE ADULT - ASSESSMENT
53F with Perforated diverticulitis and 1.7x3.8cm collection had nausea and pain overnight with clears, currently asymptomatic   -continue clears  -OOB/ambulate  -IV abx   -dvt ppx  - FU CT

## 2019-02-27 ENCOUNTER — TRANSCRIPTION ENCOUNTER (OUTPATIENT)
Age: 54
End: 2019-02-27

## 2019-02-27 VITALS
TEMPERATURE: 98 F | OXYGEN SATURATION: 99 % | SYSTOLIC BLOOD PRESSURE: 140 MMHG | DIASTOLIC BLOOD PRESSURE: 78 MMHG | RESPIRATION RATE: 18 BRPM | HEART RATE: 81 BPM

## 2019-02-27 LAB
ANION GAP SERPL CALC-SCNC: 12 MMO/L — SIGNIFICANT CHANGE UP (ref 7–14)
BUN SERPL-MCNC: 4 MG/DL — LOW (ref 7–23)
CALCIUM SERPL-MCNC: 9.2 MG/DL — SIGNIFICANT CHANGE UP (ref 8.4–10.5)
CHLORIDE SERPL-SCNC: 103 MMOL/L — SIGNIFICANT CHANGE UP (ref 98–107)
CO2 SERPL-SCNC: 24 MMOL/L — SIGNIFICANT CHANGE UP (ref 22–31)
CREAT SERPL-MCNC: 0.92 MG/DL — SIGNIFICANT CHANGE UP (ref 0.5–1.3)
GLUCOSE SERPL-MCNC: 85 MG/DL — SIGNIFICANT CHANGE UP (ref 70–99)
HCT VFR BLD CALC: 39.5 % — SIGNIFICANT CHANGE UP (ref 34.5–45)
HGB BLD-MCNC: 12 G/DL — SIGNIFICANT CHANGE UP (ref 11.5–15.5)
MAGNESIUM SERPL-MCNC: 1.9 MG/DL — SIGNIFICANT CHANGE UP (ref 1.6–2.6)
MCHC RBC-ENTMCNC: 25.6 PG — LOW (ref 27–34)
MCHC RBC-ENTMCNC: 30.4 % — LOW (ref 32–36)
MCV RBC AUTO: 84.4 FL — SIGNIFICANT CHANGE UP (ref 80–100)
NRBC # FLD: 0 K/UL — LOW (ref 25–125)
PHOSPHATE SERPL-MCNC: 3.8 MG/DL — SIGNIFICANT CHANGE UP (ref 2.5–4.5)
PLATELET # BLD AUTO: 281 K/UL — SIGNIFICANT CHANGE UP (ref 150–400)
PMV BLD: 10.3 FL — SIGNIFICANT CHANGE UP (ref 7–13)
POTASSIUM SERPL-MCNC: 3.7 MMOL/L — SIGNIFICANT CHANGE UP (ref 3.5–5.3)
POTASSIUM SERPL-SCNC: 3.7 MMOL/L — SIGNIFICANT CHANGE UP (ref 3.5–5.3)
RBC # BLD: 4.68 M/UL — SIGNIFICANT CHANGE UP (ref 3.8–5.2)
RBC # FLD: 13.4 % — SIGNIFICANT CHANGE UP (ref 10.3–14.5)
SODIUM SERPL-SCNC: 139 MMOL/L — SIGNIFICANT CHANGE UP (ref 135–145)
WBC # BLD: 5.05 K/UL — SIGNIFICANT CHANGE UP (ref 3.8–10.5)
WBC # FLD AUTO: 5.05 K/UL — SIGNIFICANT CHANGE UP (ref 3.8–10.5)

## 2019-02-27 PROCEDURE — 99232 SBSQ HOSP IP/OBS MODERATE 35: CPT

## 2019-02-27 RX ORDER — ACETAMINOPHEN 500 MG
2 TABLET ORAL
Qty: 0 | Refills: 0 | COMMUNITY
Start: 2019-02-27

## 2019-02-27 RX ORDER — DILTIAZEM HCL 120 MG
0 CAPSULE, EXT RELEASE 24 HR ORAL
Qty: 0 | Refills: 0 | COMMUNITY

## 2019-02-27 RX ADMIN — OXYCODONE HYDROCHLORIDE 5 MILLIGRAM(S): 5 TABLET ORAL at 11:44

## 2019-02-27 RX ADMIN — PIPERACILLIN AND TAZOBACTAM 25 GRAM(S): 4; .5 INJECTION, POWDER, LYOPHILIZED, FOR SOLUTION INTRAVENOUS at 05:34

## 2019-02-27 RX ADMIN — OXYCODONE HYDROCHLORIDE 5 MILLIGRAM(S): 5 TABLET ORAL at 12:30

## 2019-02-27 RX ADMIN — ENOXAPARIN SODIUM 40 MILLIGRAM(S): 100 INJECTION SUBCUTANEOUS at 11:44

## 2019-02-27 RX ADMIN — FAMOTIDINE 20 MILLIGRAM(S): 10 INJECTION INTRAVENOUS at 11:48

## 2019-02-27 NOTE — PROGRESS NOTE ADULT - ASSESSMENT
53F with Perforated diverticulitis and 1.7x3.8cm collection doing well on LRD  -continue LRD  -OOB/ambulate  - IV abx Zosyn day 6  - dvt ppx  - FU CT results  - Seen and discussed with A team 53F with Perforated diverticulitis and 1.7x3.8cm collection doing well on LRD Ct yesterday reveals Improving descending colon diverticulitis.  -continue LRD  -OOB/ambulate  - IV abx Zosyn day 6  - dvt ppt  - Seen and discussed with A team 53F with Perforated diverticulitis and 1.7x3.8cm collection doing well on LRD Ct yesterday reveals Improving descending colon diverticulitis.  -continue LRD  - OOB/ambulate  - IV abx Zosyn day 6  - dvt ppt  - Discharge today with outpatient antibiotics  - Seen and discussed with A team and Chief Li

## 2019-02-27 NOTE — DISCHARGE NOTE ADULT - HOSPITAL COURSE
53F presented with abdominal pain, CT scan revealed diverticulitis and 1.7 x 3.8cm collection. She was made npo and started on antibiotics, Post op patients diet was slowly advanced as tolerated. Ct scan repeated on 2/26 and revealed Improving descending colon diverticulitis. At this time, pt is tolerating a low fiber regular diet, ambulating and voiding.  Pt has been deemed stable for discharge at this time. 53F presented with abdominal pain, CT scan revealed diverticulitis and 1.7 x 3.8cm collection. She was made npo and started on antibiotics, Post op patients diet was slowly advanced as tolerated. Ct scan repeated on 2/26 and revealed Improving descending colon diverticulitis. At this time, pt is tolerating a low fiber regular diet, ambulating and voiding.  Pt to be d/c with 5 days augmentin with out patient follow up. Pt has been deemed stable for discharge at this time.

## 2019-02-27 NOTE — DISCHARGE NOTE ADULT - MEDICATION SUMMARY - MEDICATIONS TO TAKE
I will START or STAY ON the medications listed below when I get home from the hospital:    acetaminophen 325 mg oral tablet  -- 2 tab(s) by mouth every 6 hours, As needed, Mild Pain (1 - 3)  -- Indication: For For Pain as needed    Augmentin 875 mg-125 mg oral tablet  -- 1 tab(s) by mouth every 12 hours MDD:2  -- Finish all this medication unless otherwise directed by prescriber.  Take with food or milk.    -- Indication: For Antibiotics for diverticulitis

## 2019-02-27 NOTE — DISCHARGE NOTE ADULT - PLAN OF CARE
s/p Antibiotics DIET: Low fiber diet  NOTIFY YOUR SURGEON IF YOU HAVE:  any fever (over 100.4 F) persistent nausea/vomiting, or if your pain is not controlled.  Please follow up with your primary care physician in one week regarding your hospitalization, bring copies of your discharge paperwork.  Please follow up with your surgeon, Dr. Moyer for colonscopy and future surgical planning DIET: Low fiber diet  NOTIFY YOUR SURGEON IF YOU HAVE:  any fever (over 100.4 F) persistent nausea/vomiting, or if your pain is not controlled.  Please follow up with your primary care physician in one week regarding your hospitalization, bring copies of your discharge paperwork.  Please follow up with your surgeon, Dr. Moyer for colonoscopy and future surgical planning

## 2019-02-27 NOTE — DISCHARGE NOTE ADULT - CARE PLAN
Monitor summary, HR - 70-76, .20/.12/.44, SR with BBB.   Principal Discharge DX:	Diverticulitis of large intestine with complication  Goal:	s/p Antibiotics  Assessment and plan of treatment:	DIET: Low fiber diet  NOTIFY YOUR SURGEON IF YOU HAVE:  any fever (over 100.4 F) persistent nausea/vomiting, or if your pain is not controlled.  Please follow up with your primary care physician in one week regarding your hospitalization, bring copies of your discharge paperwork.  Please follow up with your surgeon, Dr. Moyer for colonscopy and future surgical planning Principal Discharge DX:	Diverticulitis of large intestine with complication  Goal:	s/p Antibiotics  Assessment and plan of treatment:	DIET: Low fiber diet  NOTIFY YOUR SURGEON IF YOU HAVE:  any fever (over 100.4 F) persistent nausea/vomiting, or if your pain is not controlled.  Please follow up with your primary care physician in one week regarding your hospitalization, bring copies of your discharge paperwork.  Please follow up with your surgeon, Dr. Moyer for colonoscopy and future surgical planning

## 2019-02-27 NOTE — DISCHARGE NOTE ADULT - CARE PROVIDER_API CALL
Gilmar Moyer)  ColonRectal Surgery; Surgery  [facility], [address] [place] [address]  Phone: [phone]  Fax: [phone]  Follow Up Time:

## 2019-02-27 NOTE — PROGRESS NOTE ADULT - SUBJECTIVE AND OBJECTIVE BOX
Morning Surgical Progress Note  Patient is a 53y old  Female who presents with a chief complaint of     SUBJECTIVE: Patient seen and examined at bedside with surgical team, patient without complaints.     Vital Signs Last 24 Hrs  T(C): 36.8 (27 Feb 2019 05:30), Max: 37.1 (26 Feb 2019 21:04)  T(F): 98.3 (27 Feb 2019 05:30), Max: 98.7 (26 Feb 2019 21:04)  HR: 71 (27 Feb 2019 05:30) (61 - 79)  BP: 117/65 (27 Feb 2019 05:30) (117/65 - 147/77)  BP(mean): --  RR: 18 (27 Feb 2019 05:30) (18 - 20)  SpO2: 98% (27 Feb 2019 05:30) (96% - 100%)I&O's Detail    25 Feb 2019 07:01  -  26 Feb 2019 07:00  --------------------------------------------------------  IN:    dextrose 5% + sodium chloride 0.45%: 1600 mL    IV PiggyBack: 400 mL  Total IN: 2000 mL    OUT:    Voided: 1770 mL  Total OUT: 1770 mL    Total NET: 230 mL      26 Feb 2019 07:01  -  27 Feb 2019 05:59  --------------------------------------------------------  IN:    dextrose 5% + sodium chloride 0.45%: 1200 mL    IV PiggyBack: 100 mL  Total IN: 1300 mL    OUT:    Voided: 1350 mL  Total OUT: 1350 mL    Total NET: -50 mL      MEDICATIONS  (STANDING):  enoxaparin Injectable 40 milliGRAM(s) SubCutaneous daily  famotidine Injectable 20 milliGRAM(s) IV Push daily  piperacillin/tazobactam IVPB. 3.375 Gram(s) IV Intermittent every 8 hours    MEDICATIONS  (PRN):  acetaminophen   Tablet .. 650 milliGRAM(s) Oral every 6 hours PRN Mild Pain (1 - 3)  ondansetron Injectable 4 milliGRAM(s) IV Push every 6 hours PRN Nausea and/or Vomiting  oxyCODONE    IR 5 milliGRAM(s) Oral every 4 hours PRN Moderate Pain (4 - 6)      Physical Exam  Constitutional: A&Ox3, NAD  Respiratory: unlabored breathing  Gastrointestinal: soft nontender nondistended      LABS:                        12.2   5.61  )-----------( 299      ( 26 Feb 2019 05:39 )             40.1     02-26    139  |  102  |  4<L>  ----------------------------<  103<H>  3.8   |  25  |  0.94    Ca    9.1      26 Feb 2019 05:39  Phos  3.6     02-26  Mg     1.9     02-26 Morning Surgical Progress Note  Patient is a 53y old  Female who presents with a chief complaint of     SUBJECTIVE: Patient seen and examined at bedside with surgical team, patient without complaints.     Vital Signs Last 24 Hrs  T(C): 36.8 (27 Feb 2019 05:30), Max: 37.1 (26 Feb 2019 21:04)  T(F): 98.3 (27 Feb 2019 05:30), Max: 98.7 (26 Feb 2019 21:04)  HR: 71 (27 Feb 2019 05:30) (61 - 79)  BP: 117/65 (27 Feb 2019 05:30) (117/65 - 147/77)  BP(mean): --  RR: 18 (27 Feb 2019 05:30) (18 - 20)  SpO2: 98% (27 Feb 2019 05:30) (96% - 100%)I&O's Detail    25 Feb 2019 07:01  -  26 Feb 2019 07:00  --------------------------------------------------------  IN:    dextrose 5% + sodium chloride 0.45%: 1600 mL    IV PiggyBack: 400 mL  Total IN: 2000 mL    OUT:    Voided: 1770 mL  Total OUT: 1770 mL    Total NET: 230 mL      26 Feb 2019 07:01  -  27 Feb 2019 05:59  --------------------------------------------------------  IN:    dextrose 5% + sodium chloride 0.45%: 1200 mL    IV PiggyBack: 100 mL  Total IN: 1300 mL    OUT:    Voided: 1350 mL  Total OUT: 1350 mL    Total NET: -50 mL      MEDICATIONS  (STANDING):  enoxaparin Injectable 40 milliGRAM(s) SubCutaneous daily  famotidine Injectable 20 milliGRAM(s) IV Push daily  piperacillin/tazobactam IVPB. 3.375 Gram(s) IV Intermittent every 8 hours    MEDICATIONS  (PRN):  acetaminophen   Tablet .. 650 milliGRAM(s) Oral every 6 hours PRN Mild Pain (1 - 3)  ondansetron Injectable 4 milliGRAM(s) IV Push every 6 hours PRN Nausea and/or Vomiting  oxyCODONE    IR 5 milliGRAM(s) Oral every 4 hours PRN Moderate Pain (4 - 6)      Physical Exam  Constitutional: A&Ox3, NAD  Respiratory: unlabored breathing  Gastrointestinal: soft nontender nondistended      LABS:                        12.2   5.61  )-----------( 299      ( 26 Feb 2019 05:39 )             40.1     02-26    139  |  102  |  4<L>  ----------------------------<  103<H>  3.8   |  25  |  0.94    Ca    9.1      26 Feb 2019 05:39  Phos  3.6     02-26  Mg     1.9     02-26      CT   IMPRESSION: Improving descending colon diverticulitis.

## 2019-02-27 NOTE — DISCHARGE NOTE ADULT - PATIENT PORTAL LINK FT
You can access the [facility] Portal, offered by [facility], by registering with the following website: [email or web address]

## 2019-03-07 ENCOUNTER — APPOINTMENT (OUTPATIENT)
Dept: COLORECTAL SURGERY | Facility: CLINIC | Age: 54
End: 2019-03-07
Payer: COMMERCIAL

## 2019-03-07 VITALS
HEIGHT: 62 IN | WEIGHT: 193 LBS | SYSTOLIC BLOOD PRESSURE: 125 MMHG | DIASTOLIC BLOOD PRESSURE: 88 MMHG | BODY MASS INDEX: 35.51 KG/M2 | HEART RATE: 96 BPM

## 2019-03-07 DIAGNOSIS — Z86.79 PERSONAL HISTORY OF OTHER DISEASES OF THE CIRCULATORY SYSTEM: ICD-10-CM

## 2019-03-07 DIAGNOSIS — R10.30 LOWER ABDOMINAL PAIN, UNSPECIFIED: ICD-10-CM

## 2019-03-07 PROCEDURE — 99213 OFFICE O/P EST LOW 20 MIN: CPT

## 2019-03-07 NOTE — ASSESSMENT
[FreeTextEntry1] : Perforated diverticulitis\par -Restart patient's antibiotics as we ran out yesterday\par -Repeat CT scan to evaluate resolution of abscess and disease\par -We discussed the indications for elective sigmoid colon resection. After resolution of active inflammation, we will schedule colonoscopy followed by likely laparoscopic sigmoid colon resection

## 2019-03-07 NOTE — HISTORY OF PRESENT ILLNESS
[FreeTextEntry1] : 53-year-old female with recent admission for perforated diverticulitis. Patient progressing well. Some cramping left lower quadrant. Denies fevers or chills. Relatively normal bowel movements

## 2019-03-26 NOTE — ED PROVIDER NOTE - TEMPLATE, MLM
[facility] Nurse Inpatient Skin Assessment     Follow up Assessment of:   Bilateral hands and feet      Data:   Patient History:      Per MD note(s):  Margie Stiles is a 4 year old unimmunized, previously healthy female who presents with a week of cold symptoms followed by one day of vomiting, diarrhea, and lethargy, found to be in septic shock in ED, with fever to 102.7F, cap refill 4 seconds, hypotension in 40s/20s that did not adequately respond to NS boluses in the ED, intubated with central and arterial lines place, and placed on epinephrine, dopamine, and norepinephrine drips, now stable on epinephrine and dopamine. Influenza A positive today; started Tamiflu.      Moisture Management:  Diaper      Current Diet / Nutrition:     None             Mobility: 3-->slightly limited       Activity: 2-->chairfast    Sensory Perception: 3-->slightly limited   Moisture: 3-->occasionally moist   Friction and Shear: 4-->no apparent problem  Nutrition: 3-->adequate   Milan Q Score: 21         Labs:   Recent Labs   Lab Test 03/14/19  1430 03/14/19  0516  03/12/19  0950   ALBUMIN  --  1.4*   < > 2.1*   HGB  --  12.6   < > 12.2   RBC  --  4.53   < > 4.49   WBC  --  23.3*   < > 6.1   PLT  --  129*   < > 313   INR 1.21* 1.22*   < >  --    CRP  --   --   --  215.0*    < > = values in this interval not displayed.                 Right hand 3/21/19       Right palm 3/21/19        Right hand 3/26/19       Right palm 3/26/19             Left palm 3/21/19            Left palm 3/26/19         Left hand 3/26/19                   Right posterior calf 3/21/19    Right posterior calf 3/26/19             Left posterior calf 3/21/19  Left posterior calf 3/26/19      Skin Assessment : Bilateral hands and feet  History: Per RN note: Color, cap refill, and temperature of extremities has ranged from cold to slightly cool, cap refill 4-7 seconds with a warm core. Lower extremity pulses are ausculted with doppler,  upper extremities are 2+. Margie has several areas of purple-dark purple discolorations on her left hand due to hypoperfusion and vasoconstriction. Nitroglycerin paste used. Areas seen to be less localized and more generalized to the entire left hand (team aware). Hands and feet have been wrapped with warm blankets and hot packs to assist in warming.    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 Duskiness extends to the level of the wrist. Intrinsic plus positioning of fingers. No spontaneous movement of the digits witnessed. 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 Small area of duskiness to the thumb. Normal resting position of fingers. Spontaneous flexion and extension of all digits witnessed. Bilateral lower extremities with spontaneous movement with stimulation. Small area of skin slough / abrasion to the lateral leg on the left. Right foot and leg with scattered areas of duskiness but no eschar.     3/18/19: Leaches and nitropaste no longer in use.  She is extubated, more awake and engaged though at the time today's assessment she is sedated post procedure.   3/26/19: Awake, alert, chatting throughout cares. Patient without pain during assessment.   Right 5th finger 1 x 1.2 x 0 cm decompressed bulla  Right posterior calf: 4 x 2 x 0 cm intact bulla and 3 x 3.5 x 0.1 cm unroofed bulla draining serous fluid  Left lateral calf: 1 x 1.5 x 0.1 cm unroofed bulla draining serous fluid.    Team request WOC assist to provide guidance on keeping skin intact.    Vascular will be consulted per RN    Skin: intact,      Color: dusky    Temperature cool to warm areas    Drainage:  None except from left palm with de souza as expected    Amount: small .     Color: bloody     Odor: none    Pain:  unable to assess           Intervention:     Patient's  chart evaluated.      Cares performed:    Orders  Written    Supplies  discussed with RN    Discussed plan of care with Patient, Family, Nurse and Physician          Assessment:      Ischemia to hands and feet being treated with nitropaste, Nitroglycerin paste to bilateral wrists and ankles per MD orders.         Plan:   Nursing to notify the Provider(s) and re-consult the [facility] Nurse if skin deteriorate(s).    Wound care for     Bilateral calf wounds: Daily cleanse with microklenz or saline and pat dry.  Apply Aquaphor ointment to intact and open bulla.  Cover with Mepitel.  Secure with Dianna roll (order # [id number]) and stretch net.    Right and left hand: Daily cleanse with saline.  Apply Aquaphor to open areas, cut Mepitel (order # [id number]) to fit over open areas, secure with dianna (order # [id number]) and stretch net. No not use Aquaphor to black fingers, cover with dry dianna wrap.    [facility] Nurse will return: twice weekly          General

## 2019-03-28 ENCOUNTER — APPOINTMENT (OUTPATIENT)
Dept: COLORECTAL SURGERY | Facility: CLINIC | Age: 54
End: 2019-03-28
Payer: COMMERCIAL

## 2019-03-28 PROCEDURE — 99213 OFFICE O/P EST LOW 20 MIN: CPT

## 2019-03-28 NOTE — ASSESSMENT
[FreeTextEntry1] : Perforated diverticulitis\par -Given patient's perforated diverticulitis with abscess, recommended to undergo laparoscopic-assisted sigmoid colon resection. We discussed risks and benefits at length including bleeding, infection, risk of injury to nearby structures, possible anastomotic dehiscence, possible open procedure and possible colostomy creation.\par -All questions were answered\par -Patient wishes to proceed we'll schedule earliest convenience\par -Patient will undergo colonoscopy the day prior to surgery

## 2019-05-02 ENCOUNTER — OUTPATIENT (OUTPATIENT)
Dept: OUTPATIENT SERVICES | Facility: HOSPITAL | Age: 54
LOS: 1 days | End: 2019-05-02
Payer: COMMERCIAL

## 2019-05-02 VITALS
WEIGHT: 195.11 LBS | DIASTOLIC BLOOD PRESSURE: 90 MMHG | TEMPERATURE: 98 F | OXYGEN SATURATION: 98 % | HEIGHT: 62.5 IN | SYSTOLIC BLOOD PRESSURE: 130 MMHG | HEART RATE: 68 BPM | RESPIRATION RATE: 18 BRPM

## 2019-05-02 DIAGNOSIS — K57.32 DIVERTICULITIS OF LARGE INTESTINE WITHOUT PERFORATION OR ABSCESS WITHOUT BLEEDING: ICD-10-CM

## 2019-05-02 DIAGNOSIS — Z98.51 TUBAL LIGATION STATUS: Chronic | ICD-10-CM

## 2019-05-02 DIAGNOSIS — Z90.49 ACQUIRED ABSENCE OF OTHER SPECIFIED PARTS OF DIGESTIVE TRACT: Chronic | ICD-10-CM

## 2019-05-02 DIAGNOSIS — K57.92 DIVERTICULITIS OF INTESTINE, PART UNSPECIFIED, WITHOUT PERFORATION OR ABSCESS WITHOUT BLEEDING: ICD-10-CM

## 2019-05-02 LAB
ANION GAP SERPL CALC-SCNC: 12 MMO/L — SIGNIFICANT CHANGE UP (ref 7–14)
BASOPHILS # BLD AUTO: 0.05 K/UL — SIGNIFICANT CHANGE UP (ref 0–0.2)
BASOPHILS NFR BLD AUTO: 0.6 % — SIGNIFICANT CHANGE UP (ref 0–2)
BLD GP AB SCN SERPL QL: NEGATIVE — SIGNIFICANT CHANGE UP
BUN SERPL-MCNC: 20 MG/DL — SIGNIFICANT CHANGE UP (ref 7–23)
CALCIUM SERPL-MCNC: 9.2 MG/DL — SIGNIFICANT CHANGE UP (ref 8.4–10.5)
CHLORIDE SERPL-SCNC: 104 MMOL/L — SIGNIFICANT CHANGE UP (ref 98–107)
CO2 SERPL-SCNC: 22 MMOL/L — SIGNIFICANT CHANGE UP (ref 22–31)
CREAT SERPL-MCNC: 0.81 MG/DL — SIGNIFICANT CHANGE UP (ref 0.5–1.3)
EOSINOPHIL # BLD AUTO: 0.08 K/UL — SIGNIFICANT CHANGE UP (ref 0–0.5)
EOSINOPHIL NFR BLD AUTO: 1 % — SIGNIFICANT CHANGE UP (ref 0–6)
GLUCOSE SERPL-MCNC: 75 MG/DL — SIGNIFICANT CHANGE UP (ref 70–99)
HBA1C BLD-MCNC: 5.9 % — HIGH (ref 4–5.6)
HCT VFR BLD CALC: 38.7 % — SIGNIFICANT CHANGE UP (ref 34.5–45)
HGB BLD-MCNC: 12 G/DL — SIGNIFICANT CHANGE UP (ref 11.5–15.5)
IMM GRANULOCYTES NFR BLD AUTO: 0.3 % — SIGNIFICANT CHANGE UP (ref 0–1.5)
LYMPHOCYTES # BLD AUTO: 2.56 K/UL — SIGNIFICANT CHANGE UP (ref 1–3.3)
LYMPHOCYTES # BLD AUTO: 32.8 % — SIGNIFICANT CHANGE UP (ref 13–44)
MCHC RBC-ENTMCNC: 25.5 PG — LOW (ref 27–34)
MCHC RBC-ENTMCNC: 31 % — LOW (ref 32–36)
MCV RBC AUTO: 82.3 FL — SIGNIFICANT CHANGE UP (ref 80–100)
MONOCYTES # BLD AUTO: 0.6 K/UL — SIGNIFICANT CHANGE UP (ref 0–0.9)
MONOCYTES NFR BLD AUTO: 7.7 % — SIGNIFICANT CHANGE UP (ref 2–14)
NEUTROPHILS # BLD AUTO: 4.5 K/UL — SIGNIFICANT CHANGE UP (ref 1.8–7.4)
NEUTROPHILS NFR BLD AUTO: 57.6 % — SIGNIFICANT CHANGE UP (ref 43–77)
NRBC # FLD: 0 K/UL — SIGNIFICANT CHANGE UP (ref 0–0)
PLATELET # BLD AUTO: 293 K/UL — SIGNIFICANT CHANGE UP (ref 150–400)
PMV BLD: 11.6 FL — SIGNIFICANT CHANGE UP (ref 7–13)
POTASSIUM SERPL-MCNC: 3.9 MMOL/L — SIGNIFICANT CHANGE UP (ref 3.5–5.3)
POTASSIUM SERPL-SCNC: 3.9 MMOL/L — SIGNIFICANT CHANGE UP (ref 3.5–5.3)
RBC # BLD: 4.7 M/UL — SIGNIFICANT CHANGE UP (ref 3.8–5.2)
RBC # FLD: 13.9 % — SIGNIFICANT CHANGE UP (ref 10.3–14.5)
RH IG SCN BLD-IMP: POSITIVE — SIGNIFICANT CHANGE UP
SODIUM SERPL-SCNC: 138 MMOL/L — SIGNIFICANT CHANGE UP (ref 135–145)
WBC # BLD: 7.81 K/UL — SIGNIFICANT CHANGE UP (ref 3.8–10.5)
WBC # FLD AUTO: 7.81 K/UL — SIGNIFICANT CHANGE UP (ref 3.8–10.5)

## 2019-05-02 PROCEDURE — 93010 ELECTROCARDIOGRAM REPORT: CPT

## 2019-05-02 RX ORDER — SODIUM CHLORIDE 9 MG/ML
1000 INJECTION, SOLUTION INTRAVENOUS
Refills: 0 | Status: DISCONTINUED | OUTPATIENT
Start: 2019-05-17 | End: 2019-05-18

## 2019-05-02 NOTE — H&P PST ADULT - HISTORY OF PRESENT ILLNESS
55 yo female presents to PST unit with pre-op diagnosis of diverticulitis of large intestine without perforation or abscess without bleeding scheduled for laparoscopic low anterior resection on 05/17/2019.

## 2019-05-02 NOTE — H&P PST ADULT - NSICDXPASTMEDICALHX_GEN_ALL_CORE_FT
PAST MEDICAL HISTORY:  History of diverticulitis     History of TIA (transient ischemic attack) 2 years ago-no anticoagulation    HTN (hypertension)

## 2019-05-02 NOTE — H&P PST ADULT - NSICDXPROBLEM_GEN_ALL_CORE_FT
PROBLEM DIAGNOSES  Problem: Diverticulitis  Assessment and Plan: Scheduled for low anterior resection on 05/17/2019.  Pre-Op instructions provided to patient.   Pepcid for GI prophylaxis provided.   Surgical scrub instructions reviewed and provided to patient.

## 2019-05-02 NOTE — H&P PST ADULT - NEGATIVE ENMT SYMPTOMS
no tinnitus/no sinus symptoms/no dysphagia/no vertigo/no hearing difficulty/no nasal congestion/no ear pain

## 2019-05-02 NOTE — H&P PST ADULT - RS GEN PE MLT RESP DETAILS PC
airway patent/breath sounds equal/no wheezes/clear to auscultation bilaterally/good air movement/respirations non-labored

## 2019-05-02 NOTE — H&P PST ADULT - NSICDXPASTSURGICALHX_GEN_ALL_CORE_FT
PAST SURGICAL HISTORY:  H/O tubal ligation     History of cholecystectomy     No significant past surgical history PAST SURGICAL HISTORY:  H/O tubal ligation     History of cholecystectomy

## 2019-05-03 PROBLEM — Z86.73 PERSONAL HISTORY OF TRANSIENT ISCHEMIC ATTACK (TIA), AND CEREBRAL INFARCTION WITHOUT RESIDUAL DEFICITS: Chronic | Status: ACTIVE | Noted: 2019-05-02

## 2019-05-15 ENCOUNTER — MESSAGE (OUTPATIENT)
Age: 54
End: 2019-05-15

## 2019-05-16 ENCOUNTER — TRANSCRIPTION ENCOUNTER (OUTPATIENT)
Age: 54
End: 2019-05-16

## 2019-05-16 ENCOUNTER — APPOINTMENT (OUTPATIENT)
Dept: COLORECTAL SURGERY | Facility: CLINIC | Age: 54
End: 2019-05-16
Payer: COMMERCIAL

## 2019-05-16 DIAGNOSIS — K64.8 OTHER HEMORRHOIDS: ICD-10-CM

## 2019-05-16 DIAGNOSIS — Z78.9 OTHER SPECIFIED HEALTH STATUS: ICD-10-CM

## 2019-05-16 PROCEDURE — 99212 OFFICE O/P EST SF 10 MIN: CPT | Mod: 25

## 2019-05-16 PROCEDURE — 45378 DIAGNOSTIC COLONOSCOPY: CPT

## 2019-05-16 RX ORDER — AMOXICILLIN AND CLAVULANATE POTASSIUM 875; 125 MG/1; MG/1
875-125 TABLET, COATED ORAL
Qty: 28 | Refills: 2 | Status: DISCONTINUED | COMMUNITY
Start: 2019-03-07 | End: 2019-05-16

## 2019-05-16 RX ORDER — AMOXICILLIN AND CLAVULANATE POTASSIUM 875; 125 MG/1; MG/1
875-125 TABLET, COATED ORAL TWICE DAILY
Qty: 28 | Refills: 2 | Status: DISCONTINUED | COMMUNITY
Start: 2019-03-08 | End: 2019-05-16

## 2019-05-16 NOTE — ASU PATIENT PROFILE, ADULT - PMH
History of diverticulitis    History of TIA (transient ischemic attack)  2 years ago-no anticoagulation  HTN (hypertension)

## 2019-05-17 ENCOUNTER — APPOINTMENT (OUTPATIENT)
Dept: COLORECTAL SURGERY | Facility: HOSPITAL | Age: 54
End: 2019-05-17

## 2019-05-17 ENCOUNTER — RESULT REVIEW (OUTPATIENT)
Age: 54
End: 2019-05-17

## 2019-05-17 ENCOUNTER — INPATIENT (INPATIENT)
Facility: HOSPITAL | Age: 54
LOS: 1 days | Discharge: HOME | End: 2019-05-19
Attending: STUDENT IN AN ORGANIZED HEALTH CARE EDUCATION/TRAINING PROGRAM | Admitting: STUDENT IN AN ORGANIZED HEALTH CARE EDUCATION/TRAINING PROGRAM
Payer: COMMERCIAL

## 2019-05-17 VITALS
DIASTOLIC BLOOD PRESSURE: 89 MMHG | OXYGEN SATURATION: 97 % | SYSTOLIC BLOOD PRESSURE: 125 MMHG | WEIGHT: 195.11 LBS | HEIGHT: 62.5 IN | TEMPERATURE: 98 F | RESPIRATION RATE: 14 BRPM | HEART RATE: 77 BPM

## 2019-05-17 DIAGNOSIS — Z98.51 TUBAL LIGATION STATUS: Chronic | ICD-10-CM

## 2019-05-17 DIAGNOSIS — Z90.49 ACQUIRED ABSENCE OF OTHER SPECIFIED PARTS OF DIGESTIVE TRACT: Chronic | ICD-10-CM

## 2019-05-17 DIAGNOSIS — K57.32 DIVERTICULITIS OF LARGE INTESTINE WITHOUT PERFORATION OR ABSCESS WITHOUT BLEEDING: ICD-10-CM

## 2019-05-17 LAB
GLUCOSE BLDC GLUCOMTR-MCNC: 104 MG/DL — HIGH (ref 70–99)
RH IG SCN BLD-IMP: POSITIVE — SIGNIFICANT CHANGE UP

## 2019-05-17 PROCEDURE — 93010 ELECTROCARDIOGRAM REPORT: CPT

## 2019-05-17 PROCEDURE — 45330 DIAGNOSTIC SIGMOIDOSCOPY: CPT

## 2019-05-17 PROCEDURE — 50715 RELEASE OF URETER: CPT | Mod: 59

## 2019-05-17 PROCEDURE — 44213 LAP MOBIL SPLENIC FL ADD-ON: CPT

## 2019-05-17 PROCEDURE — 44207 L COLECTOMY/COLOPROCTOSTOMY: CPT

## 2019-05-17 PROCEDURE — 88304 TISSUE EXAM BY PATHOLOGIST: CPT | Mod: 26

## 2019-05-17 PROCEDURE — 88307 TISSUE EXAM BY PATHOLOGIST: CPT | Mod: 26

## 2019-05-17 RX ORDER — HEPARIN SODIUM 5000 [USP'U]/ML
5000 INJECTION INTRAVENOUS; SUBCUTANEOUS EVERY 8 HOURS
Refills: 0 | Status: DISCONTINUED | OUTPATIENT
Start: 2019-05-17 | End: 2019-05-19

## 2019-05-17 RX ORDER — CELECOXIB 200 MG/1
400 CAPSULE ORAL ONCE
Refills: 0 | Status: COMPLETED | OUTPATIENT
Start: 2019-05-17 | End: 2019-05-17

## 2019-05-17 RX ORDER — ACETAMINOPHEN 500 MG
1000 TABLET ORAL EVERY 6 HOURS
Refills: 0 | Status: DISCONTINUED | OUTPATIENT
Start: 2019-05-17 | End: 2019-05-18

## 2019-05-17 RX ORDER — HYDROMORPHONE HYDROCHLORIDE 2 MG/ML
1 INJECTION INTRAMUSCULAR; INTRAVENOUS; SUBCUTANEOUS
Refills: 0 | Status: DISCONTINUED | OUTPATIENT
Start: 2019-05-17 | End: 2019-05-17

## 2019-05-17 RX ORDER — GABAPENTIN 400 MG/1
600 CAPSULE ORAL ONCE
Refills: 0 | Status: COMPLETED | OUTPATIENT
Start: 2019-05-17 | End: 2019-05-17

## 2019-05-17 RX ORDER — CHOLECALCIFEROL (VITAMIN D3) 125 MCG
1 CAPSULE ORAL
Qty: 0 | Refills: 0 | DISCHARGE

## 2019-05-17 RX ORDER — OXYCODONE HYDROCHLORIDE 5 MG/1
5 TABLET ORAL EVERY 4 HOURS
Refills: 0 | Status: DISCONTINUED | OUTPATIENT
Start: 2019-05-17 | End: 2019-05-19

## 2019-05-17 RX ORDER — HYDROMORPHONE HYDROCHLORIDE 2 MG/ML
0.5 INJECTION INTRAMUSCULAR; INTRAVENOUS; SUBCUTANEOUS
Refills: 0 | Status: DISCONTINUED | OUTPATIENT
Start: 2019-05-17 | End: 2019-05-17

## 2019-05-17 RX ORDER — ONDANSETRON 8 MG/1
4 TABLET, FILM COATED ORAL EVERY 6 HOURS
Refills: 0 | Status: DISCONTINUED | OUTPATIENT
Start: 2019-05-17 | End: 2019-05-19

## 2019-05-17 RX ORDER — OXYCODONE HYDROCHLORIDE 5 MG/1
5 TABLET ORAL
Refills: 0 | Status: DISCONTINUED | OUTPATIENT
Start: 2019-05-17 | End: 2019-05-17

## 2019-05-17 RX ORDER — NALOXONE HYDROCHLORIDE 4 MG/.1ML
0.1 SPRAY NASAL
Refills: 0 | Status: DISCONTINUED | OUTPATIENT
Start: 2019-05-17 | End: 2019-05-18

## 2019-05-17 RX ORDER — HYDROMORPHONE HYDROCHLORIDE 2 MG/ML
1 INJECTION INTRAMUSCULAR; INTRAVENOUS; SUBCUTANEOUS
Refills: 0 | Status: DISCONTINUED | OUTPATIENT
Start: 2019-05-17 | End: 2019-05-18

## 2019-05-17 RX ORDER — GRANISETRON HYDROCHLORIDE 1 MG/1
2 TABLET, FILM COATED ORAL ONCE
Refills: 0 | Status: DISCONTINUED | OUTPATIENT
Start: 2019-05-17 | End: 2019-05-17

## 2019-05-17 RX ORDER — OXYCODONE HYDROCHLORIDE 5 MG/1
10 TABLET ORAL
Refills: 0 | Status: DISCONTINUED | OUTPATIENT
Start: 2019-05-17 | End: 2019-05-17

## 2019-05-17 RX ORDER — MORPHINE SULFATE 50 MG/1
0.1 CAPSULE, EXTENDED RELEASE ORAL ONCE
Refills: 0 | Status: DISCONTINUED | OUTPATIENT
Start: 2019-05-17 | End: 2019-05-18

## 2019-05-17 RX ORDER — KETOROLAC TROMETHAMINE 30 MG/ML
15 SYRINGE (ML) INJECTION EVERY 6 HOURS
Refills: 0 | Status: DISCONTINUED | OUTPATIENT
Start: 2019-05-18 | End: 2019-05-18

## 2019-05-17 RX ADMIN — Medication 1000 MILLIGRAM(S): at 16:38

## 2019-05-17 RX ADMIN — SODIUM CHLORIDE 40 MILLILITER(S): 9 INJECTION, SOLUTION INTRAVENOUS at 11:29

## 2019-05-17 RX ADMIN — HEPARIN SODIUM 5000 UNIT(S): 5000 INJECTION INTRAVENOUS; SUBCUTANEOUS at 15:38

## 2019-05-17 RX ADMIN — GABAPENTIN 600 MILLIGRAM(S): 400 CAPSULE ORAL at 07:32

## 2019-05-17 RX ADMIN — Medication 400 MILLIGRAM(S): at 15:38

## 2019-05-17 RX ADMIN — HYDROMORPHONE HYDROCHLORIDE 0.5 MILLIGRAM(S): 2 INJECTION INTRAMUSCULAR; INTRAVENOUS; SUBCUTANEOUS at 12:10

## 2019-05-17 RX ADMIN — Medication 1000 MILLIGRAM(S): at 22:05

## 2019-05-17 RX ADMIN — CELECOXIB 400 MILLIGRAM(S): 200 CAPSULE ORAL at 07:32

## 2019-05-17 RX ADMIN — HYDROMORPHONE HYDROCHLORIDE 0.5 MILLIGRAM(S): 2 INJECTION INTRAMUSCULAR; INTRAVENOUS; SUBCUTANEOUS at 12:30

## 2019-05-17 RX ADMIN — Medication 400 MILLIGRAM(S): at 21:35

## 2019-05-17 NOTE — CHART NOTE - NSCHARTNOTEFT_GEN_A_CORE
Post-operative Check    SUBJECTIVE: No acute events in the immediate post-operative period. Pain well controlled. No nausea/vomiting.    OBJECTIVE:  T(C): 36.5 (05-17-19 @ 17:52), Max: 36.9 (05-17-19 @ 10:55)  HR: 66 (05-17-19 @ 17:52) (62 - 88)  BP: 123/69 (05-17-19 @ 17:52) (97/58 - 125/89)  RR: 18 (05-17-19 @ 17:52) (10 - 20)  SpO2: 97% (05-17-19 @ 17:52) (91% - 100%)      05-17-19 @ 07:01  -  05-17-19 @ 18:42  --------------------------------------------------------  IN: 480 mL / OUT: 920 mL / NET: -440 mL        Physical Exam:   - Constitutional: AOx3, NAD  - CV: normotensive, regular rate   - Respiratory: nonlabored  - Abdomen: soft, nontender, nondistended  - Extremities: WWP  - Vascular: distal pusles 2+  - Neurological: no focal deficits    ASSESSMENT:   CHEIKH WILD is a 54y Female with history of diverticulitis, s/p ERAS lap sigmoidectomy, stable postoperatively.     PLAN:  - Pain management  - Follow UOP  - morning labs  - Diet:   - IVF Post-operative Check    SUBJECTIVE: No acute events in the immediate post-operative period. Pain well controlled. No nausea/vomiting.    OBJECTIVE:  T(C): 36.5 (05-17-19 @ 17:52), Max: 36.9 (05-17-19 @ 10:55)  HR: 66 (05-17-19 @ 17:52) (62 - 88)  BP: 123/69 (05-17-19 @ 17:52) (97/58 - 125/89)  RR: 18 (05-17-19 @ 17:52) (10 - 20)  SpO2: 97% (05-17-19 @ 17:52) (91% - 100%)      05-17-19 @ 07:01  -  05-17-19 @ 18:42  --------------------------------------------------------  IN: 480 mL / OUT: 920 mL / NET: -440 mL        Physical Exam:   - Constitutional: AOx3, NAD  - CV: normotensive, regular rate   - Respiratory: nonlabored  - Abdomen: soft, nontender, nondistended, incisions c/d/i  - Extremities: WWP  - Vascular: distal pusles 2+  - Neurological: no focal deficits    ASSESSMENT:   CHEIKH WILD is a 54y Female with history of diverticulitis, s/p ERAS lap sigmoidectomy, stable postoperatively.     PLAN:    - Pain management - multimodal  - Follow UOP  - morning labs  - Diet: CLD  - IVF    A team surgery  [id number]

## 2019-05-17 NOTE — BRIEF OPERATIVE NOTE - OPERATION/FINDINGS
mild thickening of a limited segment of sigmoid. No abscess, no significant adhesions. Sigmoid resection with 31mm EEA stapler, oversewn anterior border (routine), negative leak test, no bleeding on flex sig.

## 2019-05-18 LAB
ANION GAP SERPL CALC-SCNC: 9 MMO/L — SIGNIFICANT CHANGE UP (ref 7–14)
BLD GP AB SCN SERPL QL: NEGATIVE — SIGNIFICANT CHANGE UP
BUN SERPL-MCNC: 6 MG/DL — LOW (ref 7–23)
CALCIUM SERPL-MCNC: 8.6 MG/DL — SIGNIFICANT CHANGE UP (ref 8.4–10.5)
CHLORIDE SERPL-SCNC: 100 MMOL/L — SIGNIFICANT CHANGE UP (ref 98–107)
CO2 SERPL-SCNC: 24 MMOL/L — SIGNIFICANT CHANGE UP (ref 22–31)
CREAT SERPL-MCNC: 0.71 MG/DL — SIGNIFICANT CHANGE UP (ref 0.5–1.3)
GLUCOSE SERPL-MCNC: 93 MG/DL — SIGNIFICANT CHANGE UP (ref 70–99)
HCT VFR BLD CALC: 35.6 % — SIGNIFICANT CHANGE UP (ref 34.5–45)
HGB BLD-MCNC: 11 G/DL — LOW (ref 11.5–15.5)
MAGNESIUM SERPL-MCNC: 2.1 MG/DL — SIGNIFICANT CHANGE UP (ref 1.6–2.6)
MCHC RBC-ENTMCNC: 26 PG — LOW (ref 27–34)
MCHC RBC-ENTMCNC: 30.9 % — LOW (ref 32–36)
MCV RBC AUTO: 84.2 FL — SIGNIFICANT CHANGE UP (ref 80–100)
NRBC # FLD: 0 K/UL — SIGNIFICANT CHANGE UP (ref 0–0)
PHOSPHATE SERPL-MCNC: 2.6 MG/DL — SIGNIFICANT CHANGE UP (ref 2.5–4.5)
PLATELET # BLD AUTO: 275 K/UL — SIGNIFICANT CHANGE UP (ref 150–400)
PMV BLD: 11.3 FL — SIGNIFICANT CHANGE UP (ref 7–13)
POTASSIUM SERPL-MCNC: 3.5 MMOL/L — SIGNIFICANT CHANGE UP (ref 3.5–5.3)
POTASSIUM SERPL-SCNC: 3.5 MMOL/L — SIGNIFICANT CHANGE UP (ref 3.5–5.3)
RBC # BLD: 4.23 M/UL — SIGNIFICANT CHANGE UP (ref 3.8–5.2)
RBC # FLD: 14 % — SIGNIFICANT CHANGE UP (ref 10.3–14.5)
RH IG SCN BLD-IMP: POSITIVE — SIGNIFICANT CHANGE UP
SODIUM SERPL-SCNC: 133 MMOL/L — LOW (ref 135–145)
WBC # BLD: 11.73 K/UL — HIGH (ref 3.8–10.5)
WBC # FLD AUTO: 11.73 K/UL — HIGH (ref 3.8–10.5)

## 2019-05-18 RX ORDER — POTASSIUM PHOSPHATE, MONOBASIC POTASSIUM PHOSPHATE, DIBASIC 236; 224 MG/ML; MG/ML
15 INJECTION, SOLUTION INTRAVENOUS ONCE
Refills: 0 | Status: COMPLETED | OUTPATIENT
Start: 2019-05-18 | End: 2019-05-18

## 2019-05-18 RX ORDER — ACETAMINOPHEN 500 MG
650 TABLET ORAL EVERY 6 HOURS
Refills: 0 | Status: DISCONTINUED | OUTPATIENT
Start: 2019-05-18 | End: 2019-05-19

## 2019-05-18 RX ORDER — MAGNESIUM OXIDE 400 MG ORAL TABLET 241.3 MG
1000 TABLET ORAL
Refills: 0 | Status: DISCONTINUED | OUTPATIENT
Start: 2019-05-18 | End: 2019-05-19

## 2019-05-18 RX ADMIN — HYDROMORPHONE HYDROCHLORIDE 1 MILLIGRAM(S): 2 INJECTION INTRAMUSCULAR; INTRAVENOUS; SUBCUTANEOUS at 08:43

## 2019-05-18 RX ADMIN — HEPARIN SODIUM 5000 UNIT(S): 5000 INJECTION INTRAVENOUS; SUBCUTANEOUS at 07:11

## 2019-05-18 RX ADMIN — Medication 15 MILLIGRAM(S): at 00:06

## 2019-05-18 RX ADMIN — OXYCODONE HYDROCHLORIDE 5 MILLIGRAM(S): 5 TABLET ORAL at 23:20

## 2019-05-18 RX ADMIN — Medication 650 MILLIGRAM(S): at 17:13

## 2019-05-18 RX ADMIN — HEPARIN SODIUM 5000 UNIT(S): 5000 INJECTION INTRAVENOUS; SUBCUTANEOUS at 17:12

## 2019-05-18 RX ADMIN — Medication 15 MILLIGRAM(S): at 13:36

## 2019-05-18 RX ADMIN — OXYCODONE HYDROCHLORIDE 5 MILLIGRAM(S): 5 TABLET ORAL at 17:43

## 2019-05-18 RX ADMIN — Medication 650 MILLIGRAM(S): at 13:34

## 2019-05-18 RX ADMIN — Medication 15 MILLIGRAM(S): at 00:36

## 2019-05-18 RX ADMIN — Medication 650 MILLIGRAM(S): at 13:04

## 2019-05-18 RX ADMIN — HYDROMORPHONE HYDROCHLORIDE 1 MILLIGRAM(S): 2 INJECTION INTRAMUSCULAR; INTRAVENOUS; SUBCUTANEOUS at 09:13

## 2019-05-18 RX ADMIN — Medication 15 MILLIGRAM(S): at 06:05

## 2019-05-18 RX ADMIN — Medication 650 MILLIGRAM(S): at 17:52

## 2019-05-18 RX ADMIN — Medication 15 MILLIGRAM(S): at 05:35

## 2019-05-18 RX ADMIN — OXYCODONE HYDROCHLORIDE 5 MILLIGRAM(S): 5 TABLET ORAL at 17:13

## 2019-05-18 RX ADMIN — Medication 1000 MILLIGRAM(S): at 04:46

## 2019-05-18 RX ADMIN — Medication 15 MILLIGRAM(S): at 13:06

## 2019-05-18 RX ADMIN — MAGNESIUM OXIDE 400 MG ORAL TABLET 1000 MILLIGRAM(S): 241.3 TABLET ORAL at 17:12

## 2019-05-18 RX ADMIN — Medication 400 MILLIGRAM(S): at 04:16

## 2019-05-18 RX ADMIN — Medication 15 MILLIGRAM(S): at 17:13

## 2019-05-18 RX ADMIN — OXYCODONE HYDROCHLORIDE 5 MILLIGRAM(S): 5 TABLET ORAL at 22:50

## 2019-05-18 RX ADMIN — HEPARIN SODIUM 5000 UNIT(S): 5000 INJECTION INTRAVENOUS; SUBCUTANEOUS at 00:06

## 2019-05-18 RX ADMIN — POTASSIUM PHOSPHATE, MONOBASIC POTASSIUM PHOSPHATE, DIBASIC 62.5 MILLIMOLE(S): 236; 224 INJECTION, SOLUTION INTRAVENOUS at 09:48

## 2019-05-18 RX ADMIN — Medication 15 MILLIGRAM(S): at 17:43

## 2019-05-18 NOTE — PROGRESS NOTE ADULT - SUBJECTIVE AND OBJECTIVE BOX
Anesthesia Pain Management Service    SUBJECTIVE: Patient s/p spinal morphine with pain managable and no problems.  Pain Scale Score:  Refer to charted pain scores    THERAPY:    s/p spinal PF morphine yesterday.      MEDICATIONS  (STANDING):  acetaminophen  IVPB .. 1000 milliGRAM(s) IV Intermittent every 6 hours  heparin  Injectable 5000 Unit(s) SubCutaneous every 8 hours  ketorolac   Injectable 15 milliGRAM(s) IV Push every 6 hours  lactated ringers. 1000 milliLiter(s) (40 mL/Hr) IV Continuous <Continuous>  morphine PF Spinal 0.1 milliGRAM(s) IntraThecal. once    MEDICATIONS  (PRN):  HYDROmorphone  Injectable 1 milliGRAM(s) IV Push every 3 hours PRN Severe Pain (7 - 10)  naloxone Injectable 0.1 milliGRAM(s) IV Push every 3 minutes PRN For ANY of the following changes in patient status:  A. RR LESS THAN 10 breaths per minute, B. Oxygen saturation LESS THAN 90%, C. Sedation score of 6  ondansetron Injectable 4 milliGRAM(s) IV Push every 6 hours PRN Nausea  oxyCODONE    IR 5 milliGRAM(s) Oral every 4 hours PRN Moderate Pain (4 - 6)      OBJECTIVE:    Sedation Score:	[ x] Alert	[ ] Drowsy	[ ] Arousable	[ ] Asleep	[ ] Unresponsive    Side Effects:	[ x] None	[ ] Nausea	[ ] Vomiting	[ ] Pruritus  		  [ ] Weakness		[ ] Numbness	[ ] Other:    Vital Signs Last 24 Hrs  T(C): 36.4 (18 May 2019 05:39), Max: 36.9 (17 May 2019 10:55)  T(F): 97.6 (18 May 2019 05:39), Max: 98.5 (17 May 2019 10:55)  HR: 70 (18 May 2019 05:39) (62 - 88)  BP: 110/66 (18 May 2019 05:39) (97/58 - 124/69)  BP(mean): 80 (17 May 2019 16:00) (64 - 80)  RR: 17 (18 May 2019 05:39) (10 - 20)  SpO2: 100% (18 May 2019 05:39) (91% - 100%)    ASSESSMENT/ PLAN  [x ] Patient transitioned to prn analgesics  [x] Pain management per primary service, pain service to sign off   [x]Documentation and Verification of current medications     Comments: PRN opioids or adjuvant medication to be given.

## 2019-05-18 NOTE — PROGRESS NOTE ADULT - SUBJECTIVE AND OBJECTIVE BOX
ANESTHESIA POSTOP CHECK    54y Female POSTOP DAY 1    [ X] NO APPARENT ANESTHESIA COMPLICATIONS      Comments:

## 2019-05-18 NOTE — PROGRESS NOTE ADULT - SUBJECTIVE AND OBJECTIVE BOX
Surgery Progress Note    S:     Patient seen and examined. No acute events overnight. Feeling well this morning, pain controlled, tolerating diet. Ambulating and voiding without difficulty.     O:    Vital Signs Last 24 Hrs  T(C): 37.1 (18 May 2019 09:51), Max: 37.1 (18 May 2019 09:51)  T(F): 98.7 (18 May 2019 09:51), Max: 98.7 (18 May 2019 09:51)  HR: 77 (18 May 2019 09:51) (62 - 88)  BP: 120/68 (18 May 2019 09:51) (97/58 - 124/69)  BP(mean): 80 (17 May 2019 16:00) (64 - 80)  RR: 18 (18 May 2019 09:51) (10 - 20)  SpO2: 99% (18 May 2019 09:51) (91% - 100%)      Physical Exam:   - Constitutional: AOx3, NAD  - CV: normotensive, regular rate   - Respiratory: nonlabored  - Abdomen: soft, nontender, nondistended, incisions c/d/i  - Extremities: WWP  - Vascular: distal pusles 2+  - Neurological: no focal deficits    I&O's Detail    17 May 2019 07:01  -  18 May 2019 07:00  --------------------------------------------------------  IN:    IV PiggyBack: 100 mL    lactated ringers.: 560 mL    Oral Fluid: 720 mL  Total IN: 1380 mL    OUT:    Indwelling Catheter - Urethral: 1570 mL    Voided: 500 mL  Total OUT: 2070 mL    Total NET: -690 mL          MEDICATIONS  (STANDING):  acetaminophen   Tablet .. 650 milliGRAM(s) Oral every 6 hours  heparin  Injectable 5000 Unit(s) SubCutaneous every 8 hours  ketorolac   Injectable 15 milliGRAM(s) IV Push every 6 hours  magnesium oxide 1000 milliGRAM(s) Oral two times a day with meals    MEDICATIONS  (PRN):  HYDROmorphone  Injectable 1 milliGRAM(s) IV Push every 3 hours PRN Severe Pain (7 - 10)  ondansetron Injectable 4 milliGRAM(s) IV Push every 6 hours PRN Nausea  oxyCODONE    IR 5 milliGRAM(s) Oral every 4 hours PRN Moderate Pain (4 - 6)      Labs:                          11.0   11.73 )-----------( 275      ( 18 May 2019 06:30 )             35.6       05-18    133<L>  |  100  |  6<L>  ----------------------------<  93  3.5   |  24  |  0.71    Ca    8.6      18 May 2019 06:30  Phos  2.6     05-18  Mg     2.1     05-18        Radiology:

## 2019-05-18 NOTE — PROGRESS NOTE ADULT - ASSESSMENT
CHEIKH WILD is a 54y Female with history of diverticulitis, s/p ERAS lap sigmoidectomy, POD1, recovering well.       - Pain management - multimodal - minimize narcotics  - TOV today  - morning labs  - Diet: LRD  - D/c IVF today  - Magnesium oxide 1gm BID  - ambulation encouraged    A team surgery  [id number]

## 2019-05-19 ENCOUNTER — TRANSCRIPTION ENCOUNTER (OUTPATIENT)
Age: 54
End: 2019-05-19

## 2019-05-19 VITALS
SYSTOLIC BLOOD PRESSURE: 120 MMHG | OXYGEN SATURATION: 99 % | TEMPERATURE: 98 F | DIASTOLIC BLOOD PRESSURE: 65 MMHG | RESPIRATION RATE: 18 BRPM | HEART RATE: 72 BPM

## 2019-05-19 LAB
ANION GAP SERPL CALC-SCNC: 9 MMO/L — SIGNIFICANT CHANGE UP (ref 7–14)
BUN SERPL-MCNC: 9 MG/DL — SIGNIFICANT CHANGE UP (ref 7–23)
CALCIUM SERPL-MCNC: 8.6 MG/DL — SIGNIFICANT CHANGE UP (ref 8.4–10.5)
CHLORIDE SERPL-SCNC: 109 MMOL/L — HIGH (ref 98–107)
CO2 SERPL-SCNC: 26 MMOL/L — SIGNIFICANT CHANGE UP (ref 22–31)
CREAT SERPL-MCNC: 0.74 MG/DL — SIGNIFICANT CHANGE UP (ref 0.5–1.3)
GLUCOSE SERPL-MCNC: 94 MG/DL — SIGNIFICANT CHANGE UP (ref 70–99)
HCT VFR BLD CALC: 33.5 % — LOW (ref 34.5–45)
HGB BLD-MCNC: 10.6 G/DL — LOW (ref 11.5–15.5)
MAGNESIUM SERPL-MCNC: 2 MG/DL — SIGNIFICANT CHANGE UP (ref 1.6–2.6)
MCHC RBC-ENTMCNC: 26.2 PG — LOW (ref 27–34)
MCHC RBC-ENTMCNC: 31.6 % — LOW (ref 32–36)
MCV RBC AUTO: 82.7 FL — SIGNIFICANT CHANGE UP (ref 80–100)
NRBC # FLD: 0 K/UL — SIGNIFICANT CHANGE UP (ref 0–0)
PHOSPHATE SERPL-MCNC: 2.4 MG/DL — LOW (ref 2.5–4.5)
PLATELET # BLD AUTO: 251 K/UL — SIGNIFICANT CHANGE UP (ref 150–400)
PMV BLD: 10.6 FL — SIGNIFICANT CHANGE UP (ref 7–13)
POTASSIUM SERPL-MCNC: 3.8 MMOL/L — SIGNIFICANT CHANGE UP (ref 3.5–5.3)
POTASSIUM SERPL-SCNC: 3.8 MMOL/L — SIGNIFICANT CHANGE UP (ref 3.5–5.3)
RBC # BLD: 4.05 M/UL — SIGNIFICANT CHANGE UP (ref 3.8–5.2)
RBC # FLD: 14.2 % — SIGNIFICANT CHANGE UP (ref 10.3–14.5)
SODIUM SERPL-SCNC: 144 MMOL/L — SIGNIFICANT CHANGE UP (ref 135–145)
WBC # BLD: 7.93 K/UL — SIGNIFICANT CHANGE UP (ref 3.8–10.5)
WBC # FLD AUTO: 7.93 K/UL — SIGNIFICANT CHANGE UP (ref 3.8–10.5)

## 2019-05-19 RX ORDER — OXYCODONE HYDROCHLORIDE 5 MG/1
1 TABLET ORAL
Qty: 18 | Refills: 0
Start: 2019-05-19 | End: 2019-05-21

## 2019-05-19 RX ADMIN — OXYCODONE HYDROCHLORIDE 5 MILLIGRAM(S): 5 TABLET ORAL at 05:20

## 2019-05-19 RX ADMIN — Medication 650 MILLIGRAM(S): at 01:03

## 2019-05-19 RX ADMIN — Medication 650 MILLIGRAM(S): at 08:12

## 2019-05-19 RX ADMIN — Medication 650 MILLIGRAM(S): at 01:33

## 2019-05-19 RX ADMIN — OXYCODONE HYDROCHLORIDE 5 MILLIGRAM(S): 5 TABLET ORAL at 11:39

## 2019-05-19 RX ADMIN — Medication 650 MILLIGRAM(S): at 07:42

## 2019-05-19 RX ADMIN — OXYCODONE HYDROCHLORIDE 5 MILLIGRAM(S): 5 TABLET ORAL at 04:50

## 2019-05-19 RX ADMIN — MAGNESIUM OXIDE 400 MG ORAL TABLET 1000 MILLIGRAM(S): 241.3 TABLET ORAL at 07:42

## 2019-05-19 RX ADMIN — Medication 650 MILLIGRAM(S): at 13:00

## 2019-05-19 RX ADMIN — Medication 650 MILLIGRAM(S): at 12:22

## 2019-05-19 RX ADMIN — HEPARIN SODIUM 5000 UNIT(S): 5000 INJECTION INTRAVENOUS; SUBCUTANEOUS at 07:41

## 2019-05-19 RX ADMIN — HEPARIN SODIUM 5000 UNIT(S): 5000 INJECTION INTRAVENOUS; SUBCUTANEOUS at 01:03

## 2019-05-19 RX ADMIN — OXYCODONE HYDROCHLORIDE 5 MILLIGRAM(S): 5 TABLET ORAL at 12:15

## 2019-05-19 NOTE — DISCHARGE NOTE PROVIDER - HOSPITAL COURSE
Patient is a 54 year old female with history of diverticulitis who presented on 5/17/19 for elective ERAS laparoscopic sigmoidectomy. Case was as planned; postoperative course unremarkable. Diet was advanced gradually and was well tolerated. Patient had full return of bowel function by postop day #2. Labs were taken daily and repletions made as needed. Patient was discharged home on POD#2 pain controlled, tolerating diet, passing flatus and having bowel movements.

## 2019-05-19 NOTE — DISCHARGE NOTE PROVIDER - CARE PROVIDERS DIRECT ADDRESSES
,[email or web address], [email or web address]

## 2019-05-19 NOTE — PROGRESS NOTE ADULT - SUBJECTIVE AND OBJECTIVE BOX
Surgery Progress Note    S:     Patient seen and examined. No acute events overnight. Feeling well this morning, pain controlled, tolerating diet. Ambulating and voiding without difficulty. Passing flatus and bowel movements.     O:    Vital Signs Last 24 Hrs  T(C): 36.7 (19 May 2019 05:34), Max: 37.1 (18 May 2019 09:51)  T(F): 98 (19 May 2019 05:34), Max: 98.7 (18 May 2019 09:51)  HR: 68 (19 May 2019 05:34) (54 - 77)  BP: 127/79 (19 May 2019 05:34) (105/65 - 137/77)  BP(mean): --  RR: 18 (19 May 2019 05:34) (18 - 18)  SpO2: 98% (19 May 2019 05:34) (96% - 99%)    Physical Exam:   - Constitutional: AOx3, NAD  - CV: normotensive, regular rate   - Respiratory: nonlabored  - Abdomen: soft, nontender, nondistended, incisions c/d/i  - Extremities: WWP  - Vascular: distal pusles 2+  - Neurological: no focal deficits    I&O's Detail    18 May 2019 07:01  -  19 May 2019 07:00  --------------------------------------------------------  IN:    Oral Fluid: 240 mL  Total IN: 240 mL    OUT:    Voided: 320 mL  Total OUT: 320 mL    Total NET: -80 mL          MEDICATIONS  (STANDING):  acetaminophen   Tablet .. 650 milliGRAM(s) Oral every 6 hours  heparin  Injectable 5000 Unit(s) SubCutaneous every 8 hours  magnesium oxide 1000 milliGRAM(s) Oral two times a day with meals    MEDICATIONS  (PRN):  ondansetron Injectable 4 milliGRAM(s) IV Push every 6 hours PRN Nausea  oxyCODONE    IR 5 milliGRAM(s) Oral every 4 hours PRN Moderate Pain (4 - 6)      Labs:                          10.6   7.93  )-----------( 251      ( 19 May 2019 06:20 )             33.5       05-19    144  |  109<H>  |  9   ----------------------------<  94  3.8   |  26  |  0.74    Ca    8.6      19 May 2019 06:20  Phos  2.4     05-19  Mg     2.0     05-19        Radiology:

## 2019-05-19 NOTE — PROGRESS NOTE ADULT - ASSESSMENT
CHEIKH WILD is a 54y Female with history of diverticulitis, s/p ERAS lap sigmoidectomy, POD2, recovering well.       - Pain management - multimodal - minimize narcotics  - TOV today  - morning labs  - Diet: LRD  - D/c IVF today  - Magnesium oxide 1gm BID  - ambulation encouraged  - dispo planning pending toleration of diet    A team surgery  [id number]

## 2019-05-19 NOTE — DISCHARGE NOTE NURSING/CASE MANAGEMENT/SOCIAL WORK - NSDCDPATPORTLINK_GEN_ALL_CORE
You can access the [facility] Patient Portal, offered by [facility], by registering with the following website: [email or web address]

## 2019-05-19 NOTE — DISCHARGE NOTE PROVIDER - NSDCFUADDAPPT_GEN_ALL_CORE_FT
WOUND CARE:   BATHING: Please do not submerge wound underwater. You may shower and/or sponge bathe.  ACTIVITY: No heavy lifting or straining. Otherwise, you may return to your usual level of physical activity. If you are taking narcotic pain medication (such as Percocet), do NOT drive a car, operate machinery or make important decisions.  DIET: Return to your usual diet.  NOTIFY YOUR SURGEON IF: You have any bleeding that does not stop, any pus draining from your wound, any fever (over 100.4 F) or chills, persistent nausea/vomiting, persistent diarrhea, or if your pain is not controlled on your discharge pain medications.  FOLLOW-UP:  1. Follow-up with Dr. Moyer within 1-2 weeks of discharge.  Please call office for appointment  2. Please follow up with your primary care physician in one week regarding your hospitalization.

## 2019-05-19 NOTE — DISCHARGE NOTE PROVIDER - CARE PROVIDER_API CALL
Gilmar Moyer)  ColonRectal Surgery; Surgery  [facility], [address] [place] [address]  Phone: [phone]  Fax: [phone]  Follow Up Time:

## 2019-05-23 PROBLEM — Z87.19 PERSONAL HISTORY OF OTHER DISEASES OF THE DIGESTIVE SYSTEM: Chronic | Status: ACTIVE | Noted: 2019-05-02

## 2019-05-28 ENCOUNTER — APPOINTMENT (OUTPATIENT)
Dept: COLORECTAL SURGERY | Facility: CLINIC | Age: 54
End: 2019-05-28
Payer: COMMERCIAL

## 2019-05-28 VITALS
HEART RATE: 79 BPM | TEMPERATURE: 97.4 F | DIASTOLIC BLOOD PRESSURE: 86 MMHG | RESPIRATION RATE: 15 BRPM | SYSTOLIC BLOOD PRESSURE: 158 MMHG | OXYGEN SATURATION: 97 %

## 2019-05-28 PROCEDURE — 99024 POSTOP FOLLOW-UP VISIT: CPT

## 2019-05-28 RX ORDER — NEOMYCIN SULFATE 500 MG/1
500 TABLET ORAL
Qty: 6 | Refills: 0 | Status: DISCONTINUED | COMMUNITY
Start: 2019-05-15 | End: 2019-05-28

## 2019-05-28 RX ORDER — METRONIDAZOLE 500 MG/1
500 TABLET ORAL
Qty: 3 | Refills: 0 | Status: DISCONTINUED | COMMUNITY
Start: 2019-05-15 | End: 2019-05-28

## 2019-05-28 RX ORDER — HYDROCORTISONE 25 MG/G
2.5 CREAM TOPICAL
Qty: 2 | Refills: 5 | Status: ACTIVE | COMMUNITY
Start: 2019-05-28 | End: 1900-01-01

## 2019-05-28 NOTE — ASSESSMENT
[FreeTextEntry1] : Perforated diverticulitis\par -Patient progressing well\par -Continue low residue diet\par -Followup in one week for remaining staple removal\par -Hydrocortisone cream to anus as needed

## 2019-05-28 NOTE — HISTORY OF PRESENT ILLNESS
[FreeTextEntry1] : Status post sigmoid colon resection laparoscopically for recurrent/Perforated diverticulitis. Patient progressing well. Tolerating diet. He is complaining of pain at the anus with bowel movements. Abdominal discomfort improving appetite slowly increasing. No fevers or chills

## 2019-06-04 ENCOUNTER — APPOINTMENT (OUTPATIENT)
Dept: COLORECTAL SURGERY | Facility: CLINIC | Age: 54
End: 2019-06-04
Payer: COMMERCIAL

## 2019-06-04 VITALS — DIASTOLIC BLOOD PRESSURE: 94 MMHG | SYSTOLIC BLOOD PRESSURE: 169 MMHG | HEART RATE: 78 BPM | TEMPERATURE: 97.3 F

## 2019-06-04 PROCEDURE — 99024 POSTOP FOLLOW-UP VISIT: CPT

## 2019-06-04 NOTE — ASSESSMENT
[FreeTextEntry1] : Perforated diverticulitis\par -Patient progressing well\par -Slowly start introducing high fiber diet\par -Follow up in 4 weeks for reevaluation

## 2019-06-04 NOTE — HISTORY OF PRESENT ILLNESS
[FreeTextEntry1] : Status post sigmoid colon resection laparoscopically for diverticulitis. Patient progressing well. Reports incisional pain. Persistent decreased appetite. No fevers or chills.

## 2019-07-02 ENCOUNTER — APPOINTMENT (OUTPATIENT)
Dept: COLORECTAL SURGERY | Facility: CLINIC | Age: 54
End: 2019-07-02
Payer: COMMERCIAL

## 2019-07-02 PROCEDURE — 99024 POSTOP FOLLOW-UP VISIT: CPT

## 2019-07-02 RX ORDER — OXYCODONE HYDROCHLORIDE 5 MG/1
5 CAPSULE ORAL
Refills: 0 | Status: DISCONTINUED | COMMUNITY
End: 2019-07-02

## 2019-07-02 RX ORDER — OXYCODONE 5 MG/1
5 TABLET ORAL
Qty: 18 | Refills: 0 | Status: DISCONTINUED | COMMUNITY
Start: 2019-05-19

## 2019-07-02 RX ORDER — FLUTICASONE PROPIONATE 50 UG/1
50 SPRAY, METERED NASAL
Qty: 16 | Refills: 0 | Status: ACTIVE | COMMUNITY
Start: 2018-01-24

## 2019-07-02 NOTE — HISTORY OF PRESENT ILLNESS
[FreeTextEntry1] : Status post sigmoid colon resection for perforated diverticulitis.Tissue progressing well. Tolerating diet. Normal bowel movements. Denies pain. No fevers or chills

## 2019-07-02 NOTE — ASSESSMENT
[FreeTextEntry1] : Perforated diverticulitis status post sigmoid colon resection\par Back patient progressing well\par -High fiber diet\par -Followup in 6 weeks for flexible sigmoidoscopy without anesthesia

## 2019-08-20 ENCOUNTER — APPOINTMENT (OUTPATIENT)
Dept: COLORECTAL SURGERY | Facility: CLINIC | Age: 54
End: 2019-08-20
Payer: COMMERCIAL

## 2019-08-20 DIAGNOSIS — K57.90 DIVERTICULOSIS OF INTESTINE, PART UNSPECIFIED, W/OUT PERFORATION OR ABSCESS W/OUT BLEEDING: ICD-10-CM

## 2019-08-20 DIAGNOSIS — K57.20 DIVERTICULITIS OF LARGE INTESTINE WITH PERFORATION AND ABSCESS W/OUT BLEEDING: ICD-10-CM

## 2019-08-20 PROCEDURE — 99213 OFFICE O/P EST LOW 20 MIN: CPT | Mod: 25

## 2019-08-20 PROCEDURE — 45330 DIAGNOSTIC SIGMOIDOSCOPY: CPT

## 2019-08-20 NOTE — ASSESSMENT
[FreeTextEntry1] : Perforated diverticulitis\par -Patient progressing well\par -High fiber diet\par -Followup as needed

## 2019-08-20 NOTE — HISTORY OF PRESENT ILLNESS
[FreeTextEntry1] : Status post laparoscopic-assisted sigmoid colon resection for recurrent diverticulitis with perforation. Patient progressing well. Currently without complaint. Tolerating diet. No fevers or chills. Normal bowel movements. Denies pain

## 2020-03-05 NOTE — H&P PST ADULT - RESPIRATORY AND THORAX
details… Xolair Counseling:  Patient informed of potential adverse effects including but not limited to fever, muscle aches, rash and allergic reactions.  The patient verbalized understanding of the proper use and possible adverse effects of Xolair.  All of the patient's questions and concerns were addressed.

## 2020-08-21 ENCOUNTER — APPOINTMENT (OUTPATIENT)
Dept: CARDIOLOGY | Facility: CLINIC | Age: 55
End: 2020-08-21
Payer: COMMERCIAL

## 2020-08-21 VITALS — DIASTOLIC BLOOD PRESSURE: 88 MMHG | SYSTOLIC BLOOD PRESSURE: 132 MMHG

## 2020-08-21 DIAGNOSIS — R06.00 DYSPNEA, UNSPECIFIED: ICD-10-CM

## 2020-08-21 DIAGNOSIS — Z86.2 PERSONAL HISTORY OF DISEASES OF THE BLOOD AND BLOOD-FORMING ORGANS AND CERTAIN DISORDERS INVOLVING THE IMMUNE MECHANISM: ICD-10-CM

## 2020-08-21 DIAGNOSIS — I10 ESSENTIAL (PRIMARY) HYPERTENSION: ICD-10-CM

## 2020-08-21 DIAGNOSIS — Z87.891 PERSONAL HISTORY OF NICOTINE DEPENDENCE: ICD-10-CM

## 2020-08-21 DIAGNOSIS — Z82.49 FAMILY HISTORY OF ISCHEMIC HEART DISEASE AND OTHER DISEASES OF THE CIRCULATORY SYSTEM: ICD-10-CM

## 2020-08-21 DIAGNOSIS — Z83.3 FAMILY HISTORY OF DIABETES MELLITUS: ICD-10-CM

## 2020-08-21 PROCEDURE — 93000 ELECTROCARDIOGRAM COMPLETE: CPT

## 2020-08-21 PROCEDURE — 99244 OFF/OP CNSLTJ NEW/EST MOD 40: CPT

## 2020-08-21 NOTE — HISTORY OF PRESENT ILLNESS
[FreeTextEntry1] : Sonia is a 55 year old female with HTN, diverticulitis s/p resection, here for evaluation.\par She reports dyspnea on exertion and palpitations after exertion. She has pretty bad heart burn, though this does not seem to be related to exertion. She reports a decrease in exercise tolerance overall, and has not been exercising as much.\par She has a history of HTN, and was previously on diltiazem.\par She is not on any medications at current time and her BP has been well controlled. \par She is a former smoker, though quit 20 years ago. She is a social drinker.\par Echocardiogram 6/2020 with normal LV function, concentric lvh with mild diastolic dysfunction.\par Blood work is 6/2020 with Hb 12.9, hemoglobin a1c 6.2, and ldl of 126.

## 2020-08-21 NOTE — PHYSICAL EXAM
[Well Groomed] : well groomed [Normal Appearance] : normal appearance [General Appearance - Well Developed] : well developed [General Appearance - Well Nourished] : well nourished [No Deformities] : no deformities [General Appearance - In No Acute Distress] : no acute distress [Eyelids - No Xanthelasma] : the eyelids demonstrated no xanthelasmas [Normal Conjunctiva] : the conjunctiva exhibited no abnormalities [No Oral Cyanosis] : no oral cyanosis [No Oral Pallor] : no oral pallor [Normal Oral Mucosa] : normal oral mucosa [Normal Jugular Venous A Waves Present] : normal jugular venous A waves present [Normal Jugular Venous V Waves Present] : normal jugular venous V waves present [No Jugular Venous Perez A Waves] : no jugular venous perez A waves [Normal S1] : normal S1 [Normal Rate] : normal [Normal S2] : normal S2 [S3] : no S3 [No Murmur] : no murmurs heard [S4] : no S4 [Left Carotid Bruit] : no bruit heard over the left carotid [Right Carotid Bruit] : no bruit heard over the right carotid [Right Femoral Bruit] : no bruit heard over the right femoral artery [Left Femoral Bruit] : no bruit heard over the left femoral artery [2+] : left 2+ [No Abnormalities] : the abdominal aorta was not enlarged and no bruit was heard [No Pitting Edema] : no pitting edema present [Exaggerated Use Of Accessory Muscles For Inspiration] : no accessory muscle use [Respiration, Rhythm And Depth] : normal respiratory rhythm and effort [Abdomen Soft] : soft [Auscultation Breath Sounds / Voice Sounds] : lungs were clear to auscultation bilaterally [Abdomen Tenderness] : non-tender [Abnormal Walk] : normal gait [Abdomen Mass (___ Cm)] : no abdominal mass palpated [Gait - Sufficient For Exercise Testing] : the gait was sufficient for exercise testing [Nail Clubbing] : no clubbing of the fingernails [Petechial Hemorrhages (___cm)] : no petechial hemorrhages [Cyanosis, Localized] : no localized cyanosis [Skin Color & Pigmentation] : normal skin color and pigmentation [] : no rash [No Skin Ulcers] : no skin ulcer [Skin Lesions] : no skin lesions [No Venous Stasis] : no venous stasis [No Xanthoma] : no  xanthoma was observed [Oriented To Time, Place, And Person] : oriented to person, place, and time [Affect] : the affect was normal [Mood] : the mood was normal [No Anxiety] : not feeling anxious

## 2020-08-21 NOTE — DISCUSSION/SUMMARY
[FreeTextEntry1] : Sonia is a 55 year old female here for evaluation. She reports symptoms suggestive of heart burn, and dyspnea on exertion.\par Her EKG is a SR without obvious ischemia or chamber enlargement. Her BP is not far from goal. Her physical exam is unremarkable. LV function on echocardiogram is normal.\par Given her symptoms, we will start with an exercise stress test. I have stressed the importance of diet, exercise and weight loss, to reduce her BP and overall CV risk. Her echocardiogram did show borderline LVH, and so BP control will be key.\par We will speak after the stress test and arrange follow up.

## 2020-08-21 NOTE — END OF VISIT
[Time Spent: ___ minutes] : I have spent [unfilled] minutes of time on the encounter. [>50% of the face to face encounter time was spent on counseling and/or coordination of care for ___] : Greater than 50% of the face to face encounter time was spent on counseling and/or coordination of care for [unfilled] Martha

## 2020-09-03 ENCOUNTER — APPOINTMENT (OUTPATIENT)
Dept: CARDIOLOGY | Facility: CLINIC | Age: 55
End: 2020-09-03
Payer: COMMERCIAL

## 2020-09-03 PROCEDURE — 93015 CV STRESS TEST SUPVJ I&R: CPT

## 2021-07-06 NOTE — ASU PATIENT PROFILE, ADULT - REASON FOR ADMISSION, PROFILE
PATIENT NEEDS REFILL ON:prior authorization FOR zolpidem (AMBIEN) 10 MG tablet      PATIENT CAN BE REACHED ON:[phone]    PHARMACY PREFERRED:[facility] [address] [place] [address] [facility] AT [facility]  - [phone]  - [phone]   [phone]    
"i'm having a part of my colon removed due to diverticulitis'

## 2021-10-08 ENCOUNTER — EMERGENCY (EMERGENCY)
Facility: HOSPITAL | Age: 56
LOS: 1 days | Discharge: HOME | End: 2021-10-08
Attending: EMERGENCY MEDICINE | Admitting: EMERGENCY MEDICINE
Payer: COMMERCIAL

## 2021-10-08 VITALS
OXYGEN SATURATION: 98 % | DIASTOLIC BLOOD PRESSURE: 70 MMHG | RESPIRATION RATE: 18 BRPM | HEART RATE: 83 BPM | SYSTOLIC BLOOD PRESSURE: 137 MMHG | TEMPERATURE: 97 F | HEIGHT: 62.5 IN

## 2021-10-08 VITALS
DIASTOLIC BLOOD PRESSURE: 68 MMHG | OXYGEN SATURATION: 99 % | TEMPERATURE: 98 F | HEART RATE: 73 BPM | SYSTOLIC BLOOD PRESSURE: 129 MMHG | RESPIRATION RATE: 15 BRPM

## 2021-10-08 DIAGNOSIS — Z90.49 ACQUIRED ABSENCE OF OTHER SPECIFIED PARTS OF DIGESTIVE TRACT: Chronic | ICD-10-CM

## 2021-10-08 DIAGNOSIS — Z98.51 TUBAL LIGATION STATUS: Chronic | ICD-10-CM

## 2021-10-08 LAB
ALBUMIN SERPL ELPH-MCNC: 4.5 G/DL — SIGNIFICANT CHANGE UP (ref 3.3–5)
ALP SERPL-CCNC: 101 U/L — SIGNIFICANT CHANGE UP (ref 40–120)
ALT FLD-CCNC: 23 U/L — SIGNIFICANT CHANGE UP (ref 4–33)
ANION GAP SERPL CALC-SCNC: 13 MMOL/L — SIGNIFICANT CHANGE UP (ref 7–14)
AST SERPL-CCNC: 21 U/L — SIGNIFICANT CHANGE UP (ref 4–32)
BASOPHILS # BLD AUTO: 0.05 K/UL — SIGNIFICANT CHANGE UP (ref 0–0.2)
BASOPHILS NFR BLD AUTO: 0.6 % — SIGNIFICANT CHANGE UP (ref 0–2)
BILIRUB SERPL-MCNC: 0.3 MG/DL — SIGNIFICANT CHANGE UP (ref 0.2–1.2)
BUN SERPL-MCNC: 10 MG/DL — SIGNIFICANT CHANGE UP (ref 7–23)
CALCIUM SERPL-MCNC: 9 MG/DL — SIGNIFICANT CHANGE UP (ref 8.4–10.5)
CHLORIDE SERPL-SCNC: 104 MMOL/L — SIGNIFICANT CHANGE UP (ref 98–107)
CO2 SERPL-SCNC: 23 MMOL/L — SIGNIFICANT CHANGE UP (ref 22–31)
CREAT SERPL-MCNC: 0.7 MG/DL — SIGNIFICANT CHANGE UP (ref 0.5–1.3)
EOSINOPHIL # BLD AUTO: 0.15 K/UL — SIGNIFICANT CHANGE UP (ref 0–0.5)
EOSINOPHIL NFR BLD AUTO: 1.8 % — SIGNIFICANT CHANGE UP (ref 0–6)
GLUCOSE SERPL-MCNC: 118 MG/DL — HIGH (ref 70–99)
HCT VFR BLD CALC: 40.5 % — SIGNIFICANT CHANGE UP (ref 34.5–45)
HGB BLD-MCNC: 13.1 G/DL — SIGNIFICANT CHANGE UP (ref 11.5–15.5)
IANC: 4.32 K/UL — SIGNIFICANT CHANGE UP (ref 1.5–8.5)
IMM GRANULOCYTES NFR BLD AUTO: 0.7 % — SIGNIFICANT CHANGE UP (ref 0–1.5)
LYMPHOCYTES # BLD AUTO: 3.43 K/UL — HIGH (ref 1–3.3)
LYMPHOCYTES # BLD AUTO: 40.4 % — SIGNIFICANT CHANGE UP (ref 13–44)
MCHC RBC-ENTMCNC: 26.5 PG — LOW (ref 27–34)
MCHC RBC-ENTMCNC: 32.3 GM/DL — SIGNIFICANT CHANGE UP (ref 32–36)
MCV RBC AUTO: 82 FL — SIGNIFICANT CHANGE UP (ref 80–100)
MONOCYTES # BLD AUTO: 0.49 K/UL — SIGNIFICANT CHANGE UP (ref 0–0.9)
MONOCYTES NFR BLD AUTO: 5.8 % — SIGNIFICANT CHANGE UP (ref 2–14)
NEUTROPHILS # BLD AUTO: 4.32 K/UL — SIGNIFICANT CHANGE UP (ref 1.8–7.4)
NEUTROPHILS NFR BLD AUTO: 50.7 % — SIGNIFICANT CHANGE UP (ref 43–77)
NRBC # BLD: 0 /100 WBCS — SIGNIFICANT CHANGE UP
NRBC # FLD: 0 K/UL — SIGNIFICANT CHANGE UP
PLATELET # BLD AUTO: 304 K/UL — SIGNIFICANT CHANGE UP (ref 150–400)
POTASSIUM SERPL-MCNC: 3.6 MMOL/L — SIGNIFICANT CHANGE UP (ref 3.5–5.3)
POTASSIUM SERPL-SCNC: 3.6 MMOL/L — SIGNIFICANT CHANGE UP (ref 3.5–5.3)
PROT SERPL-MCNC: 8.1 G/DL — SIGNIFICANT CHANGE UP (ref 6–8.3)
RBC # BLD: 4.94 M/UL — SIGNIFICANT CHANGE UP (ref 3.8–5.2)
RBC # FLD: 14.2 % — SIGNIFICANT CHANGE UP (ref 10.3–14.5)
SODIUM SERPL-SCNC: 140 MMOL/L — SIGNIFICANT CHANGE UP (ref 135–145)
WBC # BLD: 8.5 K/UL — SIGNIFICANT CHANGE UP (ref 3.8–10.5)
WBC # FLD AUTO: 8.5 K/UL — SIGNIFICANT CHANGE UP (ref 3.8–10.5)

## 2021-10-08 PROCEDURE — 70498 CT ANGIOGRAPHY NECK: CPT | Mod: 26

## 2021-10-08 PROCEDURE — 70496 CT ANGIOGRAPHY HEAD: CPT | Mod: 26

## 2021-10-08 PROCEDURE — 99285 EMERGENCY DEPT VISIT HI MDM: CPT

## 2021-10-08 RX ORDER — SODIUM CHLORIDE 9 MG/ML
1000 INJECTION INTRAMUSCULAR; INTRAVENOUS; SUBCUTANEOUS ONCE
Refills: 0 | Status: COMPLETED | OUTPATIENT
Start: 2021-10-08 | End: 2021-10-08

## 2021-10-08 RX ORDER — ACETAMINOPHEN 500 MG
975 TABLET ORAL ONCE
Refills: 0 | Status: COMPLETED | OUTPATIENT
Start: 2021-10-08 | End: 2021-10-08

## 2021-10-08 RX ORDER — MECLIZINE HCL 12.5 MG
25 TABLET ORAL ONCE
Refills: 0 | Status: COMPLETED | OUTPATIENT
Start: 2021-10-08 | End: 2021-10-08

## 2021-10-08 RX ORDER — MECLIZINE HCL 12.5 MG
1 TABLET ORAL
Qty: 9 | Refills: 0
Start: 2021-10-08 | End: 2021-10-10

## 2021-10-08 RX ORDER — METOCLOPRAMIDE HCL 10 MG
10 TABLET ORAL ONCE
Refills: 0 | Status: COMPLETED | OUTPATIENT
Start: 2021-10-08 | End: 2021-10-08

## 2021-10-08 RX ORDER — ONDANSETRON 8 MG/1
1 TABLET, FILM COATED ORAL
Qty: 6 | Refills: 0
Start: 2021-10-08 | End: 2021-10-09

## 2021-10-08 RX ADMIN — Medication 25 MILLIGRAM(S): at 19:12

## 2021-10-08 RX ADMIN — Medication 975 MILLIGRAM(S): at 19:12

## 2021-10-08 RX ADMIN — SODIUM CHLORIDE 1000 MILLILITER(S): 9 INJECTION INTRAMUSCULAR; INTRAVENOUS; SUBCUTANEOUS at 19:12

## 2021-10-08 RX ADMIN — Medication 10 MILLIGRAM(S): at 19:14

## 2021-10-08 NOTE — ED PROVIDER NOTE - ATTENDING CONTRIBUTION TO CARE
55 y/o female with pmhx of TIA, HTN, vertigo, presents to ED c/o dizziness, headache, photophobia, nausea x 5 hours. Pt states she was at work and developed sudden onset "room spinning" dizziness with a left sided temporal headache and nausea. +sensitivity to light. Hx of vertigo in the past. States she had TIAs before with facial numbness, denies numbness now. No weakness, tingling, neck pain, vision changes, cp, sob, vomiting. No hx of migraines.  pt ambulated, complete resolution of headache, steady well appearing gait with no neuro deficits. possible complex migraines. ct within 6 hrs of onset, low likelihood of SAH, symptomatic improvement with reglan, provided neuro f/u. for outpt neuro.

## 2021-10-08 NOTE — ED ADULT TRIAGE NOTE - CHIEF COMPLAINT QUOTE
Patient brought to ER by EMS from urgent Care for c/o dizziness, nausea since 1pm. Pt sent to ER for further evaluation to r/o vertigo. Patient brought to ER by EMS from urgent Care for c/o dizziness, nausea since 1pm. Pt sent to ER for further evaluation to r/o vertigo.

## 2021-10-08 NOTE — ED PROVIDER NOTE - CLINICAL SUMMARY MEDICAL DECISION MAKING FREE TEXT BOX
55 y/o female with pmhx of TIA, HTN, vertigo, presents to ED c/o dizziness, headache, photophobia, nausea x 5 hours. Pt states she was at work and developed sudden onset "room spinning" dizziness with a left sided temporal headache and nausea. +sensitivity to light. Hx of vertigo in the past. States she had TIAs before with facial numbness, denies numbness now. No hx of migraines. pt with horizontal nystagmus on exam, no dysmetria. likely peripheral vertigo vs migraine. plan to check cta for signs of cva. meclizine, reglan, tylenol, reassess

## 2021-10-08 NOTE — ED PROVIDER NOTE - PATIENT PORTAL LINK FT
You can access the FollowMyHealth Patient Portal offered by [facility] by registering at the following website: [email or web address]. By joining [employer]’s FollowMyHealth portal, you will also be able to view your health information using other applications (apps) compatible with our system.

## 2021-10-08 NOTE — ED ADULT NURSE NOTE - CHIEF COMPLAINT QUOTE
Patient brought to ER by EMS from urgent Care for c/o dizziness, nausea since 1pm. Pt sent to ER for further evaluation to r/o vertigo.

## 2021-10-08 NOTE — ED PROVIDER NOTE - CONSTITUTIONAL, MLM
Well appearing, awake, alert, oriented to person, place, time/situation and in no apparent distress. Has lights turned off given photophobia normal...

## 2021-10-08 NOTE — ED PROVIDER NOTE - NSFOLLOWUPINSTRUCTIONS_ED_ALL_ED_FT
Follow up with Neurology and ENT- referral list given    Take Meclizine 25mg every 8 hours as needed for dizziness- do not drivel caution drowsiness  Dissolve Zofran tablet under the tongue every 8 hours as needed for nausea/vomiting     Worsening, continued or new concerning symptoms return to the emergency department.

## 2021-10-08 NOTE — ED ADULT NURSE NOTE - OBJECTIVE STATEMENT
57y/o female A&ox4, ambulatory received in int7. pt c/o dizziness, n/v since 1:30pm. sent into ED for eval of vertigo. c/o sensitivity to light at this time, eyes closed. pt able to follow commands, equal strength noted b/l upper and lower extremities. equal sensation b/l extremities. respirations even and unlabored. airway patent. denies blurred vision, headache. md at bedside for eval. 18g iv placed in RAC, labs drawn and sent. md at bedside for eval. bed in lowest position, side rails up, call bell in hand, safety maintained. awaiting further orders. will continue to monitor.

## 2021-10-08 NOTE — ED PROVIDER NOTE - OBJECTIVE STATEMENT
55 y/o female with pmhx of TIA, HTN, vertigo, presents to ED c/o dizziness, headache, photophobia, nausea x 5 hours. Pt states she was at work and developed sudden onset "room spinning" dizziness with a left sided temporal headache and nausea. +sensitivity to light. Hx of vertigo in the past. States she had TIAs before with facial numbness, denies numbness now. No weakness, tingling, neck pain, vision changes, cp, sob, vomiting. No hx of migraines.

## 2021-10-18 ENCOUNTER — NON-APPOINTMENT (OUTPATIENT)
Age: 56
End: 2021-10-18

## 2021-10-19 ENCOUNTER — APPOINTMENT (OUTPATIENT)
Dept: ORTHOPEDIC SURGERY | Facility: CLINIC | Age: 56
End: 2021-10-19
Payer: COMMERCIAL

## 2021-10-19 VITALS
OXYGEN SATURATION: 96 % | DIASTOLIC BLOOD PRESSURE: 91 MMHG | SYSTOLIC BLOOD PRESSURE: 135 MMHG | HEART RATE: 94 BPM | BODY MASS INDEX: 34.78 KG/M2 | HEIGHT: 62 IN | WEIGHT: 189 LBS

## 2021-10-19 DIAGNOSIS — M17.11 UNILATERAL PRIMARY OSTEOARTHRITIS, RIGHT KNEE: ICD-10-CM

## 2021-10-19 PROCEDURE — 99204 OFFICE O/P NEW MOD 45 MIN: CPT

## 2021-10-19 PROCEDURE — 73564 X-RAY EXAM KNEE 4 OR MORE: CPT | Mod: RT

## 2021-10-19 RX ORDER — DICLOFENAC SODIUM 50 MG/1
50 TABLET, DELAYED RELEASE ORAL
Qty: 60 | Refills: 1 | Status: ACTIVE | COMMUNITY
Start: 2021-10-19 | End: 1900-01-01

## 2021-10-19 NOTE — DISCUSSION/SUMMARY
[de-identified] : The patient and I discussed the causes and progression of degenerative joint disease of the knee. Models, diagrams and drawings were used in the discussion. Treatment can include conservative non-operative management and surgical options. Conservative management includes weight loss, activity modification, physical therapy to improve motion and strength in the muscles around the knee and the body's core, PO and topical NSAIDs, corticosteroid and/or viscosupplementation intra-articular injections. If the patient fails to improve with non-operative management, surgical management is possible. Depending upon the patient's age, BMI, activity level, degree and location of arthrosis different surgical options are possible including arthroscopic debridement with chondroplasty, high-tibial osteotomy, unicondylar/partial arthroplasty, and total joint arthroplasty.\par \par Physical therapy was prescribed for knee ROM exercises, strengthening exercises, deep tissue massage, core strengthening, hip abductor strengthening, VMO strengthening, modalities PRN, and home exercises\par \par The patient was prescribed Diclofenac PO non-steroidal anti-inflammatory medication. 50mg tablets twice daily to be taken for at least 1-2 weeks in a row and then PRN afterwards. Risks and benefits were discussed and include but not limited to renal damage and GI ulceration and bleeding.  They were advised to take with food to limit stomach upset as well as warned to stop the medication if worsening gastric pain or dizziness or other side effects. Also to immediately stop the medication and seek appropriate medical attention if any severe stomach ache, gastritis, black/red vomit, black/red stools or any other medical concern.\par \par discuss this medication with her pmd as reported can take advil but not aleve but unknown reason\par \par declined csi\par \par The patient verifies their understanding the the visit, diagnosis and plan. They agree with the treatment plan and will contact the office with any questions or problems.\par Follow up\par PRN

## 2021-10-19 NOTE — PHYSICAL EXAM
[de-identified] : Physical Examination\par General: well nourished, in no acute distress, alert and oriented to person, place and time\par Psychiatric: normal mood and affect, no abnormal movements or speech patterns\par Eyes: vision intact - glasses\par \par \par Musculoskeletal Examination\par Ambulation	+ antalgic gait, - assistive devices\par \par Knee			Right			Left\par General\par      Swelling/Deformity	normal			normal	\par      Skin			normal			normal\par      Erythema		-			-\par      Standing Alignment	varus			neutral\par      Effusion		trace			none\par Range of Motion\par      Hip			full painless ROM		full painless ROM\par      Knee Flexion		100			100\par      Knee Extension	0			0\par Patella\par      J Sign		-			-\par      Quad Medial/Lateral	1/1 1/1\par      Apprehension		-			-\par      Wyatt's		+			-\par      Grind Sign		+			-\par      Crepitus		+			-\par Palpation\par      Medial Joint Line	+			-\par      Medial Fem Condyle	-			-\par      Lateral Joint Line	-			-\par      Quad Tendon		-			-\par      Patella Tendon	-			-\par      Medial Patella		-			-\par      Lateral Patella 	-			-\par      Posterior Knee	+			-\par Ligamentous\par      Varus @ 0° / 30°	-/-			-/-\par      Valgus @ 0° / 30°	-/-			-/-\par      Lachman		-			-\par      Pivot Shift		-			-\par      Anterior Drawer	-			-\par      Posterior Drawer	-			-\par Meniscus\par      Tasha		-			-\par      Flexion Pinch		-			-\par Strength Examination/Atrophy\par      Hip Flexors 		5+			5+\par      Quadriceps		5+			5+\par      Hamstring		5+			5+\par      Tibialis Anterior	5+			5+\par      Achilles/Soleus	5+			5+\par Sensation\par      Deep Peroneal	normal			normal\par      Superficial Peroneal 	normal			normal\par      Sural  		normal			normal\par      Posterior Tibial 	normal			normal\par      Saphneous 		normal			normal\par Pulses\par      DP			2+			2+\par  [de-identified] : 4 views of the affected Right knee (standing AP, flexing standing AP, 30degree flexed lateral, sunrise view)\par were ordered, obtained and evaluated by myself today and\par demonstrate:\par There is moderate medial wb narrowing\par small osteophytic lipping\par trace suprapatellar effusion\par mild medial patellofemoral joint space loss without evidence of tilt [or] subluxation on sunrise view\par Normal soft tissue density\par Otherwise normal osseous bone structure without fracture or dislocation

## 2021-10-19 NOTE — HISTORY OF PRESENT ILLNESS
[de-identified] : CC Right knee Pain\par \par 57 yo Female presents with Acute onset of Activity Related pain in the Medial Right knee. Patient reports the pain started approximately 4 months ago while walking. The pain is the same and rated a 5 out of 10, described as Aching , without radiation. Rest and Diclofenac Topical Ointment makes the pain better and ambulation makes the pain worse. The patient reports associated symptoms of cracking and instability. The patient denies pain at night affecting sleep, and denies similar pain previously.\par \par The patient has tried the following treatments:\par Activity modification	+\par Ice/Compression  	-\par Braces    		-\par Nsaids    		+\par Physical Therapy 	-\par Cortisone Injection	-\par Visco Injection		-\par Zilretta			-\par Arthroscopy		-\par \par Review of Systems is positive for the above musculoskeletal symptoms and is otherwise non-contributory for general, constitutional, psychiatric, neurologic, HEENT, cardiac, respiratory, gastrointestinal, reproductive, lymphatic, and dermatologic complaints.\par \par Consult by  []\par

## 2021-10-29 NOTE — ED PROVIDER NOTE - NEUROLOGICAL MOTOR
Call down to cath lab- notified fabrizio that patient still on amio drip and patient SB heart rate 50s     RN states that Dr Dary Bennett will adjust medication     Will monitor normal

## 2022-02-07 ENCOUNTER — RX RENEWAL (OUTPATIENT)
Age: 57
End: 2022-02-07

## 2022-02-07 RX ORDER — DICLOFENAC SODIUM 50 MG/1
50 TABLET, DELAYED RELEASE ORAL
Qty: 60 | Refills: 1 | Status: ACTIVE | COMMUNITY
Start: 2021-12-15 | End: 1900-01-01

## 2022-04-06 ENCOUNTER — RX RENEWAL (OUTPATIENT)
Age: 57
End: 2022-04-06

## 2022-12-07 ENCOUNTER — EMERGENCY (EMERGENCY)
Facility: HOSPITAL | Age: 57
LOS: 1 days | Discharge: HOME | End: 2022-12-07
Attending: EMERGENCY MEDICINE | Admitting: EMERGENCY MEDICINE
Payer: COMMERCIAL

## 2022-12-07 VITALS
DIASTOLIC BLOOD PRESSURE: 84 MMHG | SYSTOLIC BLOOD PRESSURE: 148 MMHG | OXYGEN SATURATION: 99 % | RESPIRATION RATE: 18 BRPM | TEMPERATURE: 98 F | HEART RATE: 115 BPM

## 2022-12-07 VITALS
DIASTOLIC BLOOD PRESSURE: 87 MMHG | TEMPERATURE: 99 F | SYSTOLIC BLOOD PRESSURE: 138 MMHG | RESPIRATION RATE: 18 BRPM | OXYGEN SATURATION: 100 % | HEART RATE: 97 BPM

## 2022-12-07 DIAGNOSIS — Z90.49 ACQUIRED ABSENCE OF OTHER SPECIFIED PARTS OF DIGESTIVE TRACT: Chronic | ICD-10-CM

## 2022-12-07 DIAGNOSIS — Z98.51 TUBAL LIGATION STATUS: Chronic | ICD-10-CM

## 2022-12-07 PROCEDURE — 99284 EMERGENCY DEPT VISIT MOD MDM: CPT

## 2022-12-07 NOTE — ED ADULT TRIAGE NOTE - CHIEF COMPLAINT QUOTE
Pt crying on arrival appears very uncomfortable...arrives with left wrist in ace wrap  Pt st " I fell around 8:30....I was at [employer] and slipped and fell put my hand out to stop fall and wrist is swollen." + left wrist swelling  + radial pulses, fingers warm + brisk cap refill.

## 2022-12-08 PROCEDURE — 73080 X-RAY EXAM OF ELBOW: CPT | Mod: 26,LT

## 2022-12-08 PROCEDURE — 73110 X-RAY EXAM OF WRIST: CPT | Mod: 26,LT

## 2022-12-08 PROCEDURE — 99283 EMERGENCY DEPT VISIT LOW MDM: CPT | Mod: 25

## 2022-12-08 PROCEDURE — 73090 X-RAY EXAM OF FOREARM: CPT | Mod: 26,LT

## 2022-12-08 PROCEDURE — 25605 CLTX DST RDL FX/EPHYS SEP W/: CPT | Mod: LT

## 2022-12-08 RX ORDER — FENTANYL CITRATE 50 UG/ML
100 INJECTION INTRAVENOUS ONCE
Refills: 0 | Status: DISCONTINUED | OUTPATIENT
Start: 2022-12-08 | End: 2022-12-08

## 2022-12-08 RX ORDER — MORPHINE SULFATE 50 MG/1
4 CAPSULE, EXTENDED RELEASE ORAL ONCE
Refills: 0 | Status: DISCONTINUED | OUTPATIENT
Start: 2022-12-08 | End: 2022-12-08

## 2022-12-08 RX ORDER — IBUPROFEN 200 MG
400 TABLET ORAL ONCE
Refills: 0 | Status: COMPLETED | OUTPATIENT
Start: 2022-12-08 | End: 2022-12-08

## 2022-12-08 RX ORDER — ALPRAZOLAM 0.25 MG
0.25 TABLET ORAL ONCE
Refills: 0 | Status: DISCONTINUED | OUTPATIENT
Start: 2022-12-08 | End: 2022-12-08

## 2022-12-08 RX ORDER — IBUPROFEN 200 MG
1 TABLET ORAL
Qty: 20 | Refills: 0
Start: 2022-12-08 | End: 2022-12-12

## 2022-12-08 RX ORDER — ACETAMINOPHEN 500 MG
2 TABLET ORAL
Qty: 60 | Refills: 0
Start: 2022-12-08 | End: 2022-12-12

## 2022-12-08 RX ORDER — OXYCODONE HYDROCHLORIDE 5 MG/1
1 TABLET ORAL
Qty: 8 | Refills: 0
Start: 2022-12-08 | End: 2022-12-09

## 2022-12-08 RX ORDER — LIDOCAINE HCL 20 MG/ML
5 VIAL (ML) INJECTION ONCE
Refills: 0 | Status: COMPLETED | OUTPATIENT
Start: 2022-12-08 | End: 2022-12-08

## 2022-12-08 RX ADMIN — MORPHINE SULFATE 4 MILLIGRAM(S): 50 CAPSULE, EXTENDED RELEASE ORAL at 01:44

## 2022-12-08 RX ADMIN — FENTANYL CITRATE 100 MICROGRAM(S): 50 INJECTION INTRAVENOUS at 02:08

## 2022-12-08 RX ADMIN — Medication 5 MILLILITER(S): at 02:32

## 2022-12-08 RX ADMIN — Medication 400 MILLIGRAM(S): at 00:25

## 2022-12-08 RX ADMIN — Medication 0.25 MILLIGRAM(S): at 01:26

## 2022-12-08 NOTE — ED PROVIDER NOTE - IV ALTEPLASE EXCL ABS HIDDEN
Problem: Patient Care Overview  Goal: Plan of Care Review  Outcome: Ongoing (interventions implemented as appropriate)   10/01/18 1440   Coping/Psychosocial   Plan of Care Reviewed With patient   Plan of Care Review   Progress improving   OTHER   Outcome Summary Pt on room air when awake, uses own trilogy at hs. O2 sats drop with activity. vss. Plan home tomorrow.          show

## 2022-12-08 NOTE — CONSULT NOTE ADULT - ASSESSMENT
NOTE IN PROGRESS    Assessment and Plan     57y year old Female  with left/right distal radius fracture. Patient hemodynamically stable.     - Multimodal Pain Control  - Ice, Elevation  - NWB LUE  - Sling for comfort  - Splint precautions:  Keep dry  Elevate extremity, can try and ice through the cast  Do not stick anything into the splint  Monitor for signs of pressure build up from swelling: pain not controlled with Tylenol/motrin, severe pain when moves the fingers/toes, numbness/tingling  - F/u as outpatient in 1 week with Dr. Thomason. Please call to make appointment.    Orthopaedic Surgery  [facility] [place] [id number]  [facility]        [id number]  [facility]  [id number]/ [phone]           Assessment and Plan     57y year old Female  with left/right distal radius fracture. Patient hemodynamically stable.     - Multimodal Pain Control  - Ice, Elevation  - NWB LUE  - Sling for comfort  - Splint precautions:  Keep dry  Elevate extremity, can try and ice through the cast  Do not stick anything into the splint  Monitor for signs of pressure build up from swelling: pain not controlled with Tylenol/motrin, severe pain when moves the fingers/toes, numbness/tingling  - F/u as outpatient in 1 week with Dr. Thomason. Please call to make appointment.    Orthopaedic Surgery  [facility] [id number]  [facility]        [id number]  [facility]  [id number]/ [phone]           Assessment and Plan     57y year old Female  with left distal radius fracture. Patient hemodynamically stable.     - Multimodal Pain Control  - Ice, Elevation  - NWB LUE  - Sling for comfort  - Splint precautions:  Keep dry  Elevate extremity, can try and ice through the cast  Do not stick anything into the splint  Monitor for signs of pressure build up from swelling: pain not controlled with Tylenol/motrin, severe pain when moves the fingers/toes, numbness/tingling  - Discussed the operative nature of this injury with patient who understood and agreed to follow up promptly for further management  - F/u as outpatient in 1 week with Dr. Thomason. Please call to make appointment.    Orthopaedic Surgery  [facility] [place] [place] [id number]  [facility]        [id number]  [facility]  [id number]/ [phone]

## 2022-12-08 NOTE — ED PROVIDER NOTE - OBJECTIVE STATEMENT
57-year-old right-hand-dominant female with no past medical history that presents with left wrist pain.  Patient states she was at a [employer] party and had a mechanical slip and fall on outstretched hand bracing herself with her left wrist and since has had severe 10 out of 10 left wrist pain nonradiating.  Denies any paresthesias but has severe pain with any movement of her wrist hands or fingers.  Drove her self home but was driven to the hospital by her daughter.  Denies any allergies to medications.  Denies any head trauma or loss of consciousness.  This incident of fall occurred 4 hours prior to arrival.  Denies any nausea, vomiting, vision or hearing changes since that time.  Has not taken any medications for her pain.,

## 2022-12-08 NOTE — ED PROVIDER NOTE - PATIENT PORTAL LINK FT
You can access the FollowMyHealth Patient Portal offered by [facility] by registering at the following website: [email or web address]. By joining [employer]’s FollowMyHealth portal, you will also be able to view your health information using other applications (apps) compatible with our system.

## 2022-12-08 NOTE — ED PROVIDER NOTE - PHYSICAL EXAMINATION
Uncomfortable appearing female sitting in chair bracing left arm and elbow.  Pulses are intact on left upper extremity.  Warm and well-perfused with normal cap refill.  Able to wiggle fingers but limited range of motion wrists but no gross musculoskeletal deformities otherwise.  Shoulder has full range of motion and sensation is intact to light touch.

## 2022-12-08 NOTE — ED PROVIDER NOTE - CARE PROVIDER_API CALL
Darcie Thomason (MD; MPH)  Orthopaedic Surgery  [address], [address]  [place] [address]  Phone: [phone]  Fax: [phone]  Follow Up Time: 7-10 Days

## 2022-12-08 NOTE — ED PROVIDER NOTE - CARDIAC, MLM
cardiologist.     His son's address:  [address] [place] [id number] [phone]     Henry Cook DO  Beverly Hospital (Beverly Hospital) [address] [facility]  [address]  [phone]       -[facility]     [address]   [address]   [place] [address]   [phone]       Care  (CTS) will continue to follow as needed.    Brandy Amaya RN, BAN   Care Coordinator  Ph. [phone]                Normal rate, regular rhythm.  Heart sounds S1, S2.  No murmurs, rubs or gallops.

## 2022-12-08 NOTE — CONSULT NOTE ADULT - SUBJECTIVE AND OBJECTIVE BOX
HPI:   57y year old Female RHD  s/p mechanical fall.  Landed on outstretched hand.  Patient had deformity and pain in right/left wrist.  Patient denies pain in any other joint, numbness, tingling, paresthesias. No pain in any other extremities.  No Head trauma or LOC.    PMH/PSH:  PAST MEDICAL & SURGICAL HISTORY:  HTN (hypertension)      History of diverticulitis      History of TIA (transient ischemic attack)  2 years ago-no anticoagulation      History of cholecystectomy      H/O tubal ligation          Allergies:  No Known Allergies      O:  T(C): 37.2 (12-07-22 @ 22:47), Max: 37.2 (12-07-22 @ 22:47)  HR: 97 (12-07-22 @ 22:47) (97 - 115)  BP: 138/87 (12-07-22 @ 22:47) (138/87 - 148/84)  RR: 18 (12-07-22 @ 22:47) (18 - 18)  SpO2: 100% (12-07-22 @ 22:47) (99% - 100%)    Gen: NAD  Resp: Non-labored breathing  RUE/LUE  skin intact vs superficial abrasion noted over   ecchymosis noted over the  swollen wrist  visible deformity of wrist  SILT med/rad/ulnar/axillary  +AIN/PIN/Med/Ulnar  Radial pulse palpable, WWP distally    Secondary:  No TTP over bony landmarks, SILT BL, ROM intact BL, distal pulses palpable.  Moving all other extremities spontaneously    Imaging:  XR demonstrating L distal radius fracture with apex volar angulation. Fracture is comminuted/intra-articular.   The ulnar styloid is intact    Procedure:  Patient underwent hematoma block.  Injected with 10 cc of 1% lidocaine without epinephrine into hematoma.  Hung in traction and reduced. Patient placed in sugartong splint.  Post reduction x-rays reviewed in improved alignment.      HPI:   57y year old Female RHD  s/p mechanical fall.  Landed on outstretched hand.  Patient had deformity and pain in right/left wrist.  Patient denies pain in any other joint, numbness, tingling, paresthesias. No pain in any other extremities.  No Head trauma or LOC.    PMH/PSH:  PAST MEDICAL & SURGICAL HISTORY:  HTN (hypertension)      History of diverticulitis      History of TIA (transient ischemic attack)  2 years ago-no anticoagulation      History of cholecystectomy      H/O tubal ligation          Allergies:  No Known Allergies      O:  T(C): 37.2 (12-07-22 @ 22:47), Max: 37.2 (12-07-22 @ 22:47)  HR: 97 (12-07-22 @ 22:47) (97 - 115)  BP: 138/87 (12-07-22 @ 22:47) (138/87 - 148/84)  RR: 18 (12-07-22 @ 22:47) (18 - 18)  SpO2: 100% (12-07-22 @ 22:47) (99% - 100%)    Gen: NAD  Resp: Non-labored breathing  LUE  skin intact ecchymosis noted over the  swollen wrist  visible deformity of wrist  TTP at the wrist  No tenderness at elbow/forearm  SILT med/rad/ulnar/axillary  +PIN/AIN/Ulnar  Able to flex thumb DIP and index DIP individually, tremendous amount of pain when trying in concert  Radial pulse palpable, WWP distally    Secondary:  No TTP over bony landmarks, SILT BL, ROM intact BL, distal pulses palpable.  Moving all other extremities spontaneously    Imaging:  XR demonstrating L distal radius fracture with apex volar angulation. Fracture is comminuted/intra-articular.   The ulnar styloid is intact    Procedure:  Patient underwent hematoma block.  Injected with 10 cc of 1% lidocaine without epinephrine into hematoma.  Hung in traction and reduced. Patient placed in sugartong splint.  Post reduction x-rays reviewed in improved alignment.  patient was much more comfortable post reduction. AIN/PIN/Ulnar nerve intact      HPI:   57y year old Female RHD  s/p mechanical fall.  Landed on outstretched hand.  Patient had deformity and pain in right/left wrist.  Patient denies pain in any other joint, numbness, tingling, paresthesias. No pain in any other extremities.  No Head trauma or LOC.    PMH/PSH:  PAST MEDICAL & SURGICAL HISTORY:  HTN (hypertension)      History of diverticulitis      History of TIA (transient ischemic attack)  2 years ago-no anticoagulation      History of cholecystectomy      H/O tubal ligation          Allergies:  No Known Allergies      O:  T(C): 37.2 (12-07-22 @ 22:47), Max: 37.2 (12-07-22 @ 22:47)  HR: 97 (12-07-22 @ 22:47) (97 - 115)  BP: 138/87 (12-07-22 @ 22:47) (138/87 - 148/84)  RR: 18 (12-07-22 @ 22:47) (18 - 18)  SpO2: 100% (12-07-22 @ 22:47) (99% - 100%)    Gen: NAD  Resp: Non-labored breathing  LUE  skin intact  no ecchymosis noted  swollen wrist  visible deformity of wrist  TTP over dorsal wrist with palpable defect  No tenderness at elbow/forearm  SILT med/rad/ulnar/axillary  +PIN/AIN/Ulnar  Able to flex thumb DIP and index DIP individually, tremendous amount of pain when trying in concert  Radial pulse palpable, WWP distally    Secondary:  No TTP over bony landmarks, SILT BL, ROM intact BL, distal pulses palpable.  Moving all other extremities spontaneously    Imaging:  XR demonstrating L distal radius fracture with apex volar angulation. Fracture is comminuted/intra-articular.   The ulnar styloid is intact    Procedure:  Patient underwent hematoma block.  Injected with 10 cc of 1% lidocaine without epinephrine into hematoma.  Hung in traction and reduced. Patient placed in sugartong splint.  Post reduction x-rays reviewed in improved alignment.  patient was much more comfortable post reduction. AIN/PIN/Ulnar nerve intact

## 2022-12-08 NOTE — ED ADULT NURSE NOTE - CHIEF COMPLAINT QUOTE
Pt crying on arrival appears very uncomfortable...arrives with left wrist in ace wrap  Pt st " I fell around 8:30....I was at [employer] and slipped and fell put my hand out to stop fall and wrist is swollen." + left wrist swelling  + radial pulses, fingers warm + brisk cap refill.

## 2022-12-08 NOTE — ED ADULT NURSE NOTE - OBJECTIVE STATEMENT
pt received to intake  , a&o4 , ambulatory  , p/w L wrist pain post fall  . Pt breathing even and unlabored on room air. Denies fever, chills, cough, SOB, chest pain, palpitations, dizziness, N/V/D, constipation, numbness, tingling. IV placed. pending xray and ortho casting . pt educated on fall precautions and confirms understanding via teach back method.  - head trama - blood thinners Stretcher locked in lowest position with siderails up x2. Call bell and personal items within reach.

## 2022-12-08 NOTE — ED PROVIDER NOTE - CLINICAL SUMMARY MEDICAL DECISION MAKING FREE TEXT BOX
57-year-old right-hand-dominant female with no past medical history that presents with left wrist pain status post mechanical slip and fall on outstretched left hand.  Likely FOOSH injury leading to fracture.  Will require x-rays of wrist and elbow but will provide pain control and ice pack.  Reassess.

## 2022-12-08 NOTE — ED PROVIDER NOTE - NSFOLLOWUPINSTRUCTIONS_ED_ALL_ED_FT
You were seen in the ED for WRIST PAIN and found to have a fracture (Broken bone) of your LEFT DISTAL RADIUS    Please follow up with Dr Thomason in ONE WEEK. see her information below and call to make an appt.     For pain, please take 650mg Tylenol every 6 hours as needed or 600mg ibuprofen every 8 hours as needed. Always take ibuprofen with food. You make take both Tylenol and ibuprofen as described above if one medication is not enough for your pain.     You were prescribed a NARCOTIC called OXYCODONE which is for severe pain.   This medication will make you drowsy so please do not take it when you have to do anything that requires concentration such as driving, working, or operating heavy machinery.   Please also refrain from drinking alcohol when on this medication as it will exacerbate your drowsiness.     Do not get your splint wet, so when taking a shower, place a bag over it to prevent it from getting wet.   If you start to have numbness or cannot move your fingers, please come back to ED immediately for an evaluation.     Attached are your results from todays visit.     Please take these results to follow up with your primary care doctor so that they can determine if you need any additional testing or treatment as an outpatient.     If your symptoms worsen or change, you can return to the ED for further evaluation and care at that time.

## 2022-12-08 NOTE — ED PROVIDER NOTE - PROGRESS NOTE DETAILS
Pt seen and evald by Ortho, placed in splint by ortho. Pain controlled. Will discharge home with PO pain meds, f/u with Ortho in 1 wk. return precautions explained and understood.

## 2022-12-12 ENCOUNTER — APPOINTMENT (OUTPATIENT)
Age: 57
End: 2022-12-12
Payer: COMMERCIAL

## 2022-12-12 VITALS — HEIGHT: 62 IN | BODY MASS INDEX: 36.25 KG/M2 | WEIGHT: 197 LBS

## 2022-12-12 PROCEDURE — 99214 OFFICE O/P EST MOD 30 MIN: CPT | Mod: 25

## 2022-12-12 PROCEDURE — 73110 X-RAY EXAM OF WRIST: CPT | Mod: LT

## 2022-12-12 PROCEDURE — 29075 APPL CST ELBW FNGR SHORT ARM: CPT | Mod: LT

## 2022-12-12 NOTE — END OF VISIT
[FreeTextEntry3] : All medical record entries made by the Scribe were at my,  Dr. Wang Siddiqui MD., direction and personally dictated by me on 12/12/2022. I have personally reviewed the chart and agree that the record accurately reflects my personal performance of the history, physical exam, assessment and plan.

## 2022-12-12 NOTE — HISTORY OF PRESENT ILLNESS
[de-identified] : Pt is a 56 y/o female with left distal radius fracture.  She slipped on the dance floor at a [employer] party on Wednesday 12/7/22.  She had pain immediately.  She went to [facility], where xrays revealed a left distal radius fracture.  The fracture was reduced and a splint was applied.  She was advised to follow up with a specialist.

## 2022-12-12 NOTE — PHYSICAL EXAM
[de-identified] : Patient is WDWN, alert, and in no acute distress. Breathing is unlabored. She is grossly oriented to person, place, and time.\par \par Left Wrist:\par She presents in a sugar tong splint, which was removed for physical exam.\par Once the splint was removed, there was no evidence of blistering or skin breakdown.\par Moderate edema noted dorsally at the wrist.\par ROM to the wrist was not accessed given injury and pain.\par Digital motion is full.\par Mild numbness noted to the left thumb. Full sensation to the digits otherwise.  [de-identified] : AP, lateral and oblique views of the LEFT wrist were obtained today and revealed a comminuted transverse fracture of the distal radius. The fracture appears to be well aligned since the reduction at the [facility] ED on 12/8/22.\par \par ------------------------------------------------------------------------------------------------------------------------------------------------------------------------------------ \par \par EXAM: XR FOREARM 2 VIEWS LT\par EXAM: XR WRIST COMP MIN 3 VIEWS LT\par EXAM: XR ELBOW COMP MIN 3V LT\par PROCEDURE DATE: 12/08/2022\par IMPRESSION:\par There is a comminuted primarily transverse fracture of the distal radial metaphysis with intra-articular extension and mild apex volar angulation with minimal impaction. \par \par There is no elbow fracture or dislocation.\par \par Post reduction view demonstrates improved alignment.\par \par JEN BLANCO MD; Resident Radiologist\par This document has been electronically signed.\par NATI CHOUDHARY MD; Attending Radiologist\par This document has been electronically signed. Dec 8 2022 9:28AM

## 2022-12-12 NOTE — DISCUSSION/SUMMARY
[de-identified] : The underlying pathophysiology was reviewed with the patient. XR films were reviewed with the patient. Discussed at length the nature of the patient’s condition. The left wrist symptoms appear secondary to distal radius fracture.\par \par At this time, given the success of the reduction performed at the [facility] ED on 12/8/22, I have recommended nonoperative management consisting of cast immobilization. I told her however the fracture will have to be monitored regularly through xray, as if the fracture does show to displace, ORIF would most likely be indicated.\par \par She was placed into a left short arm cast in the office today on 12/12/22.\par Proper cast care instructions were reviewed with the patient.\par She was instructed on ROM exercises of the shoulder, elbow, wrist and digits while casted.\par She was advised that she may use the hand for all ADLs as tolerated while casted.\par I recommended Calcium Citrate, Vitamin D3 and Vitamin C for bone health and healing.\par Finally, she was advised to pump and elevate her hand if the cast becomes tight due to swelling.\par NSAIDs prn.\par \par RX: Ibuprofen 800mg, BID (30 tablets).\par \par All questions answered, understanding verbalized. Patient in agreement with plan of care. Follow up in 1 week for repeat xrays in the cast.

## 2022-12-12 NOTE — ADDENDUM
[FreeTextEntry1] : I, Yisel Yanez wrote this note acting as a scribe for Dr. Wang Siddiqui on Dec 12, 2022.

## 2022-12-22 ENCOUNTER — APPOINTMENT (OUTPATIENT)
Dept: ORTHOPEDIC SURGERY | Facility: CLINIC | Age: 57
End: 2022-12-22
Payer: COMMERCIAL

## 2022-12-22 PROCEDURE — 99213 OFFICE O/P EST LOW 20 MIN: CPT

## 2022-12-22 PROCEDURE — 73110 X-RAY EXAM OF WRIST: CPT | Mod: LT

## 2022-12-22 NOTE — PHYSICAL EXAM
[de-identified] : Patient is WDWN, alert, and in no acute distress. Breathing is unlabored. She is grossly oriented to person, place, and time.\par \par Left Wrist:\par She presents in a left short arm cast.\par The cast appears to be fitting well, without issue.\par Digits are moving freely, with brisk capillary refill.\par Sensation is intact to the digits distally. [de-identified] : AP, lateral and oblique views of the LEFT wrist were obtained today and revealed a comminuted transverse fracture of the distal radius. The fracture appears to be well aligned since the reduction at the [facility] ED on 12/8/22.\par \par ------------------------------------------------------------------------------------------------------------------------------------------------------------------------------------ \par \par EXAM: XR FOREARM 2 VIEWS LT\par EXAM: XR WRIST COMP MIN 3 VIEWS LT\par EXAM: XR ELBOW COMP MIN 3V LT\par PROCEDURE DATE: 12/08/2022\par IMPRESSION:\par There is a comminuted primarily transverse fracture of the distal radial metaphysis with intra-articular extension and mild apex volar angulation with minimal impaction. \par \par There is no elbow fracture or dislocation.\par \par Post reduction view demonstrates improved alignment.\par \par JEN BLANCO MD; Resident Radiologist\par This document has been electronically signed.\par NATI CHOUDHARY MD; Attending Radiologist\par This document has been electronically signed. Dec 8 2022 9:28AM

## 2022-12-22 NOTE — ADDENDUM
[FreeTextEntry1] : I, Yisel Yanez wrote this note acting as a scribe for Dr. Wang Siddiqui on Dec 22, 2022.

## 2022-12-22 NOTE — HISTORY OF PRESENT ILLNESS
[de-identified] : Pt is a 58 y/o female with left distal radius fracture.  She slipped on the dance floor at a [employer] party on Wednesday 12/7/22.  She had pain immediately.  She went to [facility], where xrays revealed a left distal radius fracture.  The fracture was reduced and a splint was applied.  She was advised to follow up with a specialist. She was initially treated in the office on 12/12/22 at which time I recommended nonoperative management given the success of the reduction. She was placed into a left short arm cast. She returns for repeat xrays in the cast on 12/22/22. She states she feels a pulsing/throbbing sensation to the wrist.

## 2022-12-22 NOTE — DISCUSSION/SUMMARY
[de-identified] : The underlying pathophysiology was reviewed with the patient. XR films were reviewed with the patient. Discussed at length the nature of the patient’s condition. The left wrist symptoms appear secondary to distal radius fracture.\par \par At this time, she will continue with cast immobilization as the fracture has remained well aligned. I told her if in two weeks when she returns for repeat xrays, the cast can be changed at that time if she so chooses.\par \par All questions answered, understanding verbalized. Patient in agreement with plan of care. Follow up in 2 weeks for repeat xrays in the cast.

## 2022-12-22 NOTE — END OF VISIT
[FreeTextEntry3] : All medical record entries made by the Scribe were at my,  Dr. Wang Siddiqui MD., direction and personally dictated by me on 12/22/2022. I have personally reviewed the chart and agree that the record accurately reflects my personal performance of the history, physical exam, assessment and plan.

## 2023-01-09 ENCOUNTER — RX RENEWAL (OUTPATIENT)
Age: 58
End: 2023-01-09

## 2023-01-12 ENCOUNTER — APPOINTMENT (OUTPATIENT)
Dept: ORTHOPEDIC SURGERY | Facility: CLINIC | Age: 58
End: 2023-01-12
Payer: COMMERCIAL

## 2023-01-12 VITALS — HEIGHT: 62 IN | BODY MASS INDEX: 36.25 KG/M2 | WEIGHT: 197 LBS

## 2023-01-12 DIAGNOSIS — M54.50 LOW BACK PAIN, UNSPECIFIED: ICD-10-CM

## 2023-01-12 PROCEDURE — 99213 OFFICE O/P EST LOW 20 MIN: CPT

## 2023-01-12 PROCEDURE — 73110 X-RAY EXAM OF WRIST: CPT | Mod: LT

## 2023-01-12 NOTE — PHYSICAL EXAM
[de-identified] : Patient is WDWN, alert, and in no acute distress. Breathing is unlabored. She is grossly oriented to person, place, and time.\par \par Left Wrist:\par She presents in a left short arm cast.\par The cast appears to be fitting well, without issue.\par Digits are moving freely, with brisk capillary refill.\par She notes numbness and tingling to the left long and ring finger.\par Swelling to the digits distally seen. \par \par Lumbar Spine:\par TIngling, dull ache, and burning to the lumbar spine.\par Radicular symptoms present bilaterally to the lower extremities.\par Range of Motion: full arc of motion in all planes [de-identified] : AP, lateral and oblique views of the LEFT wrist were obtained today and revealed a comminuted transverse fracture of the distal radius. There is 16.5° of dorsal angulation which has increased from 0° seen on xrays from both 12/12/22 and 12/22/22.  Overall, the fracture is acceptably aligned. \par \par ------------------------------------------------------------------------------------------------------------------------------------------------------------------------------------ \par \par Date of Exam: 12-\par EXAM:  X-RAY LUMBAR SPINE 2 OR 3 VIEWS\par IMPRESSION:\par Level pelvis in 4 degrees right convexity lumbar scoliosis.\par Otherwise, spine: Intact vertebrae, AP alignment, sacrum, disc spaces, lordosis, and grossly intact SI joints.\par Intact medial hip joint spaces.\par Four 8 to 10 mm linear densities overlying the anterior abdominal right upper quadrant, possibly clothing.\par  \par LESLIE A [facility] MD  - Electronically Signed: 12- 9:25 PM \par \par ------------------------------------------------------------------------------------------------------------------------------------------------------------------------------------ \par \par EXAM: XR FOREARM 2 VIEWS LT\par EXAM: XR WRIST COMP MIN 3 VIEWS LT\par EXAM: XR ELBOW COMP MIN 3V LT\par PROCEDURE DATE: 12/08/2022\par IMPRESSION:\par There is a comminuted primarily transverse fracture of the distal radial metaphysis with intra-articular extension and mild apex volar angulation with minimal impaction. \par \par There is no elbow fracture or dislocation.\par \par Post reduction view demonstrates improved alignment.\par \par JEN BLANCO MD; Resident Radiologist\par This document has been electronically signed.\par NATI CHOUDHARY MD; Attending Radiologist\par This document has been electronically signed. Dec 8 2022 9:28AM

## 2023-01-12 NOTE — DISCUSSION/SUMMARY
[de-identified] : The underlying pathophysiology was reviewed with the patient. XR films were reviewed with the patient. Discussed at length the nature of the patient’s condition. The left wrist symptoms appear secondary to distal radius fracture.\par \par With regard to the left wrist, I reviewed and discussed today's xray findings in great detail with the patient. I told that there is evidence of displacement of the fracture seen as compared to prior xrays obtained on 12/12/22 and 12/22/22. With that being said, I believe it reasonable to continue nonoperatively in the cast unless she feels otherwise as although the fracture has displaced, overall it is still reasonably aligned. She stated she would like to continue with cast immobilization and has accepted the current positioning of the fracture. She will continue in the immobilized in cast for another 3 weeks.\par \par With regard to the lumbar spine, although there were no gross abnormalities seen on xrays obtained on 12/22/22, given her persistent symptoms which are not remedied with use of oral and topical medications, I have recommended an MRI for further evaluation.\par An MRI of the lumbar spine was ordered to evaluate for herniated disc. Patient will follow-up to review the results and discuss further treatment recommendations.\par \par All questions answered, understanding verbalized. Patient in agreement with plan of care. Follow up in 3 weeks for cast removal and repeat xrays.

## 2023-01-12 NOTE — HISTORY OF PRESENT ILLNESS
[de-identified] : Pt is a 58 y/o female with left distal radius fracture.  She slipped on the dance floor at a [employer] party on Wednesday 12/7/22.  She had pain immediately.  She went to [facility], where xrays revealed a left distal radius fracture.  The fracture was reduced and a splint was applied.  She was advised to follow up with a specialist. She was initially treated in the office on 12/12/22 at which time I recommended nonoperative management given the success of the reduction. She was placed into a left short arm cast. She returns for repeat xrays in the cast on 12/22/22. She stated she felt a pulsing/throbbing sensation to the wrist. She presents once again for repeat xrays on 1/12/23. She notes continued swelling to the digits and states she has numbness to the left long and ring fingers which has only been since the injury.\par \par She additionally complains of lumbar spine pain x 3 weeks. She denies injury. She notes a progressive increase in her pain over about the past 4 weeks. She reports sciatica like symptoms to the lower extremities. She underwent an xray on 12/22/22. She was advised to obtain an MRI for further evaluation and correlation.

## 2023-01-12 NOTE — ADDENDUM
[FreeTextEntry1] : I, Yisel Yanez wrote this note acting as a scribe for Dr. Wang Siddiqui on Jan 12, 2023.

## 2023-01-12 NOTE — END OF VISIT
[FreeTextEntry3] : All medical record entries made by the Scribe were at my,  Dr. Wang Siddiqui MD., direction and personally dictated by me on 01/12/2023. I have personally reviewed the chart and agree that the record accurately reflects my personal performance of the history, physical exam, assessment and plan.

## 2023-02-02 ENCOUNTER — APPOINTMENT (OUTPATIENT)
Dept: ORTHOPEDIC SURGERY | Facility: CLINIC | Age: 58
End: 2023-02-02
Payer: COMMERCIAL

## 2023-02-02 PROCEDURE — 99213 OFFICE O/P EST LOW 20 MIN: CPT

## 2023-02-02 PROCEDURE — 73110 X-RAY EXAM OF WRIST: CPT | Mod: LT

## 2023-02-02 NOTE — DISCUSSION/SUMMARY
[de-identified] : The underlying pathophysiology was reviewed with the patient. XR films were reviewed with the patient. Discussed at length the nature of the patient’s condition. The left wrist symptoms appear secondary to distal radius fracture.\par \par At this time, the left short arm cast was removed in the office today on 2/2/23. She was placed into a well molded fiber glass short arm splint for support. I recommended when home she remove the splint and soak the hand in warm water and Epsom salts and begin ROM exercises. She was advised to use the hand for all ADLs as tolerated to aide in regaining function and motion. Finally, she would like to attend physical therapy for ROM and strengthening.\par The patient wishes to proceed with hand therapy of the left wrist and hand.  A script was given.\par \par All questions answered, understanding verbalized. Patient in agreement with plan of care. Follow up in 6 weeks for repeat xrays.

## 2023-02-02 NOTE — HISTORY OF PRESENT ILLNESS
[de-identified] : Pt is a 56 y/o female with left distal radius fracture.  She slipped on the dance floor at a [employer] party on Wednesday 12/7/22.  She had pain immediately.  She went to [facility], where xrays revealed a left distal radius fracture.  The fracture was reduced and a splint was applied.  She was advised to follow up with a specialist. She was initially treated in the office on 12/12/22 at which time I recommended nonoperative management given the success of the reduction. She was placed into a left short arm cast. She returns for repeat xrays in the cast on 12/22/22. She stated she felt a pulsing/throbbing sensation to the wrist. She presents once again for repeat xrays on 1/12/23. She notes continued swelling to the digits and states she has numbness to the left long and ring fingers which has only been since the injury. She returns for repeat xrays and cast removal on 2/2/23. She notes residual pain and stiffness since the cast was removed today.

## 2023-02-02 NOTE — END OF VISIT
[FreeTextEntry3] : All medical record entries made by the Scribe were at my,  Dr. Wang Siddiqui MD., direction and personally dictated by me on 02/02/2023. I have personally reviewed the chart and agree that the record accurately reflects my personal performance of the history, physical exam, assessment and plan.

## 2023-02-02 NOTE — ADDENDUM
[FreeTextEntry1] : I, Yisel Yanez wrote this note acting as a scribe for Dr. Wang Siddiqui on Feb 02, 2023.

## 2023-02-02 NOTE — PHYSICAL EXAM
[de-identified] : Patient is WDWN, alert, and in no acute distress. Breathing is unlabored. She is grossly oriented to person, place, and time.\par \par Left Wrist:\par She presents in a left short arm cast, which was removed in the office today on 2/2/23.\par After the cast was removed, there was no evidence of skin breakdown or lesions.\par ROM at the wrist is limited given 6 weeks of immobilization.\par Digital motion is somewhat limited at end range flexion.\par She notes numbness and tingling to the left long and ring finger.\par Swelling to the digits distally seen. \par \par Lumbar Spine:\par TIngling, dull ache, and burning to the lumbar spine.\par Radicular symptoms present bilaterally to the lower extremities.\par Range of Motion: full arc of motion in all planes [de-identified] : AP, lateral and oblique views of the LEFT wrist were obtained today and revealed a comminuted transverse fracture of the distal radius. There is 16.5° of dorsal angulation which has increased from 0° seen on xrays from both 12/12/22 and 12/22/22.  Overall, the fracture is acceptably aligned. \par \par ------------------------------------------------------------------------------------------------------------------------------------------------------------------------------------ \par \par Date of Exam: 12-\par EXAM:  X-RAY LUMBAR SPINE 2 OR 3 VIEWS\par IMPRESSION:\par Level pelvis in 4 degrees right convexity lumbar scoliosis.\par Otherwise, spine: Intact vertebrae, AP alignment, sacrum, disc spaces, lordosis, and grossly intact SI joints.\par Intact medial hip joint spaces.\par Four 8 to 10 mm linear densities overlying the anterior abdominal right upper quadrant, possibly clothing.\par  \par LESLIE A [facility] MD  - Electronically Signed: 12- 9:25 PM \par \par ------------------------------------------------------------------------------------------------------------------------------------------------------------------------------------ \par \par EXAM: XR FOREARM 2 VIEWS LT\par EXAM: XR WRIST COMP MIN 3 VIEWS LT\par EXAM: XR ELBOW COMP MIN 3V LT\par PROCEDURE DATE: 12/08/2022\par IMPRESSION:\par There is a comminuted primarily transverse fracture of the distal radial metaphysis with intra-articular extension and mild apex volar angulation with minimal impaction. \par \par There is no elbow fracture or dislocation.\par \par Post reduction view demonstrates improved alignment.\par \par JEN BLANCO MD; Resident Radiologist\par This document has been electronically signed.\par NATI CHOUDHARY MD; Attending Radiologist\par This document has been electronically signed. Dec 8 2022 9:28AM

## 2023-02-09 ENCOUNTER — RX RENEWAL (OUTPATIENT)
Age: 58
End: 2023-02-09

## 2023-03-02 ENCOUNTER — APPOINTMENT (OUTPATIENT)
Dept: ORTHOPEDIC SURGERY | Facility: CLINIC | Age: 58
End: 2023-03-02
Payer: COMMERCIAL

## 2023-03-02 VITALS — WEIGHT: 190 LBS | HEIGHT: 62 IN | BODY MASS INDEX: 34.96 KG/M2

## 2023-03-02 DIAGNOSIS — S52.502A UNSPECIFIED FRACTURE OF THE LOWER END OF LEFT RADIUS, INITIAL ENCOUNTER FOR CLOSED FRACTURE: ICD-10-CM

## 2023-03-02 PROCEDURE — 99213 OFFICE O/P EST LOW 20 MIN: CPT

## 2023-03-02 PROCEDURE — 73110 X-RAY EXAM OF WRIST: CPT | Mod: LT

## 2023-03-02 NOTE — ADDENDUM
[FreeTextEntry1] : I, Yisel Yanez wrote this note acting as a scribe for Dr. Wang Siddiqui on Mar 02, 2023.

## 2023-03-02 NOTE — HISTORY OF PRESENT ILLNESS
[de-identified] : Pt is a 58 y/o female with left distal radius fracture.  She slipped on the dance floor at a [employer] party on Wednesday 12/7/22.  She had pain immediately.  She went to [facility], where xrays revealed a left distal radius fracture.  The fracture was reduced and a splint was applied.  She was advised to follow up with a specialist. She was initially treated in the office on 12/12/22 at which time I recommended nonoperative management given the success of the reduction. She was placed into a left short arm cast. She returns for repeat xrays in the cast on 12/22/22. She stated she felt a pulsing/throbbing sensation to the wrist. She presents once again for repeat xrays on 1/12/23. She notes continued swelling to the digits and states she has numbness to the left long and ring fingers which has only been since the injury. She returned for repeat xrays and cast removal on 2/2/23. She noted residual pain and stiffness since the cast was removed. She returns for repeat xrays on 3/2/23 and notes difficulty with range of motion, notably into extension. She is currently receiving hand therapy. She denies numbness and tingling to the digits but states when she touches her skin, it does feel different than the right side. She has been wearing a compression glove for edema control.

## 2023-03-02 NOTE — END OF VISIT
[FreeTextEntry3] : All medical record entries made by the Scribe were at my,  Dr. Wang Siddiqui MD., direction and personally dictated by me on 03/02/2023. I have personally reviewed the chart and agree that the record accurately reflects my personal performance of the history, physical exam, assessment and plan.

## 2023-03-02 NOTE — PHYSICAL EXAM
[de-identified] : Patient is WDWN, alert, and in no acute distress. Breathing is unlabored. She is grossly oriented to person, place, and time.\par \par Left Wrist:\par No evidence of skin breakdown or lesions.\par ROM at the wrist is limited, notably into extension.\par Digital motion is limited at end range flexion.\par She notes numbness and tingling to the left long and ring finger.\par Swelling to the digits distally seen. \par \par Lumbar Spine:\par TIngling, dull ache, and burning to the lumbar spine.\par Radicular symptoms present bilaterally to the lower extremities.\par Range of Motion: full arc of motion in all planes [de-identified] : AP, lateral and oblique views of the LEFT wrist were obtained today and revealed a comminuted transverse fracture of the distal radius. There is 16.5° of dorsal angulation which has increased from 0° seen on xrays from both 12/12/22 and 12/22/22.  Overall, the fracture is acceptably aligned. \par \par ------------------------------------------------------------------------------------------------------------------------------------------------------------------------------------ \par \par Date of Exam: 12-\par EXAM:  X-RAY LUMBAR SPINE 2 OR 3 VIEWS\par IMPRESSION:\par Level pelvis in 4 degrees right convexity lumbar scoliosis.\par Otherwise, spine: Intact vertebrae, AP alignment, sacrum, disc spaces, lordosis, and grossly intact SI joints.\par Intact medial hip joint spaces.\par Four 8 to 10 mm linear densities overlying the anterior abdominal right upper quadrant, possibly clothing.\par  \par LESLIE A [facility] MD  - Electronically Signed: 12- 9:25 PM \par \par ------------------------------------------------------------------------------------------------------------------------------------------------------------------------------------ \par \par EXAM: XR FOREARM 2 VIEWS LT\par EXAM: XR WRIST COMP MIN 3 VIEWS LT\par EXAM: XR ELBOW COMP MIN 3V LT\par PROCEDURE DATE: 12/08/2022\par IMPRESSION:\par There is a comminuted primarily transverse fracture of the distal radial metaphysis with intra-articular extension and mild apex volar angulation with minimal impaction. \par \par There is no elbow fracture or dislocation.\par \par Post reduction view demonstrates improved alignment.\par \par JEN BLANCO MD; Resident Radiologist\par This document has been electronically signed.\par NATI CHOUDHARY MD; Attending Radiologist\par This document has been electronically signed. Dec 8 2022 9:28AM

## 2023-03-02 NOTE — DISCUSSION/SUMMARY
[de-identified] : The underlying pathophysiology was reviewed with the patient. XR films were reviewed with the patient. Discussed at length the nature of the patient’s condition. The left wrist symptoms appear secondary to distal radius fracture.\par \par At this time, we discussed that maximum improvement can and will take up to 1 year post injury. Given her persistent and residual symptoms, we discussed further treatment options. I did tell her that I would not recommend further surgery to release adhesions/scar tissue as this can lead to further stiffness and swelling. I recommended she continue with a home exercise program for ROM exercises of the digits and wrist as well as hand therapy for more aggressive ROM. She was advised to use oral and topical antiinflammatories as well as to soak the hand in warm water and Epsom salts to alleviate inflammation, edema and stiffness. I also discussed she possible undergo acupuncture. Finally, the left hand was wrapped with Coban to force the digits into flexion and promote further stretching and motion. \par \par All questions answered, understanding verbalized. Patient in agreement with plan of care. Follow up in 6 weeks for repeat xrays and reevaluation.

## 2023-03-16 ENCOUNTER — RX RENEWAL (OUTPATIENT)
Age: 58
End: 2023-03-16

## 2023-04-13 ENCOUNTER — APPOINTMENT (OUTPATIENT)
Dept: ORTHOPEDIC SURGERY | Facility: CLINIC | Age: 58
End: 2023-04-13
Payer: COMMERCIAL

## 2023-04-13 DIAGNOSIS — M25.512 PAIN IN LEFT SHOULDER: ICD-10-CM

## 2023-04-13 PROCEDURE — 73030 X-RAY EXAM OF SHOULDER: CPT | Mod: LT

## 2023-04-13 NOTE — ADDENDUM
[FreeTextEntry1] : I, Yisel Yanez wrote this note acting as a scribe for Dr. Wang Siddiqui on Apr 13, 2023.

## 2023-04-13 NOTE — DISCUSSION/SUMMARY
[de-identified] : The underlying pathophysiology was reviewed with the patient. XR films were reviewed with the patient. Discussed at length the nature of the patient’s condition. The left wrist symptoms appear secondary to distal radius fracture.\par \par With regard to the left wrist, she will continue with hand therapy as well as an at home exercise program. I advised her to use the hand for all ADLs as tolerated to aide in regaining motion and function. \par \par With regard to the left shoulder, we discussed treatment options of PT, oral and topical antiinflammatories versus a cortisone injection. She deferred in the injection in lieu of PT and Ibuprofen.\par The patient wishes to proceed with physical therapy. A script was given. (ROM and strengthening)\par \par All questions answered, understanding verbalized. Patient in agreement with plan of care. Follow up as needed.

## 2023-04-13 NOTE — PHYSICAL EXAM
[de-identified] : Patient is WDWN, alert, and in no acute distress. Breathing is unlabored. She is grossly oriented to person, place, and time.\par \par Left Wrist:\par No evidence of skin breakdown or lesions.\par ROM at the wrist is limited, notably into extension.\par Digital motion is limited at end range flexion.\par She notes numbness and tingling to the left long and ring finger.\par Swelling to the digits distally seen. \par \par Left Shoulder: \par Inspection/ Palpation: there is no tenderness, swelling, or deformities. \par Range of Motion: active flexion, passive flexion, abduction, external rotation are limited, with pain.\par Strength: not accessed.\par Stability: no joint instability on provocative testing.  [de-identified] : AP, lateral and oblique views of the LEFT wrist were obtained today and revealed a comminuted transverse fracture of the distal radius. There is 16.5° of dorsal angulation which has increased from 0° seen on xrays from both 12/12/22 and 12/22/22.  Overall, the fracture is acceptably aligned. \par \par AP, transcapula, and axillary views of the LEFT shoulder were obtained today and revealed no abnormalities. No acute fracture. No dislocation. Cartilage spaces are maintained. \par \par ------------------------------------------------------------------------------------------------------------------------------------------------------------------------------------ \par \par Date of Exam: 12-\par EXAM:  X-RAY LUMBAR SPINE 2 OR 3 VIEWS\par IMPRESSION:\par Level pelvis in 4 degrees right convexity lumbar scoliosis.\par Otherwise, spine: Intact vertebrae, AP alignment, sacrum, disc spaces, lordosis, and grossly intact SI joints.\par Intact medial hip joint spaces.\par Four 8 to 10 mm linear densities overlying the anterior abdominal right upper quadrant, possibly clothing.\par  \par LESLIE A [facility] MD  - Electronically Signed: 12- 9:25 PM \par \par ------------------------------------------------------------------------------------------------------------------------------------------------------------------------------------ \par \par EXAM: XR FOREARM 2 VIEWS LT\par EXAM: XR WRIST COMP MIN 3 VIEWS LT\par EXAM: XR ELBOW COMP MIN 3V LT\par PROCEDURE DATE: 12/08/2022\par IMPRESSION:\par There is a comminuted primarily transverse fracture of the distal radial metaphysis with intra-articular extension and mild apex volar angulation with minimal impaction. \par \par There is no elbow fracture or dislocation.\par \par Post reduction view demonstrates improved alignment.\par \par JEN BLANCO MD; Resident Radiologist\par This document has been electronically signed.\par NATI CHOUDHARY MD; Attending Radiologist\par This document has been electronically signed. Dec 8 2022 9:28AM

## 2023-04-13 NOTE — END OF VISIT
[FreeTextEntry3] : All medical record entries made by the Scribe were at my,  Dr. Wang Siddiqui MD., direction and personally dictated by me on 04/13/2023. I have personally reviewed the chart and agree that the record accurately reflects my personal performance of the history, physical exam, assessment and plan.

## 2023-04-13 NOTE — HISTORY OF PRESENT ILLNESS
[de-identified] : Pt is a 58 y/o female with left distal radius fracture.  She slipped on the dance floor at a [employer] party on Wednesday 12/7/22.  She had pain immediately.  She went to [facility], where xrays revealed a left distal radius fracture.  The fracture was reduced and a splint was applied.  She was advised to follow up with a specialist. She was initially treated in the office on 12/12/22 at which time I recommended nonoperative management given the success of the reduction. She was placed into a left short arm cast. She returns for repeat xrays in the cast on 12/22/22. She stated she felt a pulsing/throbbing sensation to the wrist. She presents once again for repeat xrays on 1/12/23. She notes continued swelling to the digits and states she has numbness to the left long and ring fingers which has only been since the injury. She returned for repeat xrays and cast removal on 2/2/23. She noted residual pain and stiffness since the cast was removed. She returned on 3/2/23 and notes difficulty with range of motion, notably into extension. She is currently receiving hand therapy. She denies numbness and tingling to the digits but states when she touches her skin, it does feel different than the right side. She has been wearing a compression glove for edema control. She presents on 4/13/23 and is doing well with regard to the wrist and is in hand therapy. She is still however lacking terminal flexion of the digits.\par \par She additionally now complains of left shoulder pain. She states she believes it began due to increased strengthening that she has been performing at therapy for her left shoulder. She has increased pain with reaching.

## 2023-04-19 NOTE — PATIENT PROFILE ADULT - NUMBER OF YRS
" Assessment and Plan:     Problem List Items Addressed This Visit    None       Preventive health issues were discussed with patient, and age appropriate screening tests were ordered as noted in patient's After Visit Summary  Personalized health advice and appropriate referrals for health education or preventive services given if needed, as noted in patient's After Visit Summary  History of Present Illness:     Patient presents for a Medicare Wellness Visit    Kids with cancer  Colon and liver   Lymphoma    dxa scheduled in oct  Pod may next   Endo checked feet today too  Eye doc soon annual      BS not higher than 160      HPI   Patient Care Team:  Carola Brito as PCP - General (Nurse Practitioner)     Review of Systems:     Review of Systems   Respiratory: Negative for cough, chest tightness, shortness of breath and wheezing  Cardiovascular: Negative for chest pain, palpitations and leg swelling          Problem List:     Patient Active Problem List   Diagnosis   • Hyperlipidemia   • Leukopenia   • Osteoarthritis   • Type 2 diabetes mellitus with diabetic neuropathy, without long-term current use of insulin (HCC)   • Vitamin D deficiency   • Benign hypertension   • Hematuria, microscopic   • Calculus of kidney   • Dysuria   • Gross hematuria   • Papillary adenocarcinoma of thyroid (HCC)   • Class 1 obesity due to excess calories with serious comorbidity and body mass index (BMI) of 30 0 to 30 9 in adult   • Ambulatory dysfunction   • S/P thyroidectomy   • Postoperative hypothyroidism   • Osteopenia   • Malignant neoplasm of thyroid gland (HCC)   • Congenital anomaly of adrenal gland   • Bilateral hearing loss   • Acute right ankle pain      Past Medical and Surgical History:     Past Medical History:   Diagnosis Date   • Arthritis     osteo arthritis   • Blood in urine    • Chicken pox    • Cough     dtr\" occas feels due to goiter\"   • Diabetes mellitus ([facility] [address] )    • Disease of thyroid gland  " large goiter   • Goiter     evaluated in 2018   await f/u   • [place] (hard of hearing)    • Hyperlipidemia    • Hypertension    • Kidney stones 2017   • Kidney stones 2017   • Osteopenia    • Shortness of breath     sometimes, dtr feels related to goiter in neck   • Vertigo    • Wears glasses      Past Surgical History:   Procedure Laterality Date   • FL RETROGRADE PYELOGRAM  9/27/2019   • KIDNEY STONE SURGERY     • KNEE ARTHROSCOPY Left    • KNEE ARTHROSCOPY Right    • NH CYSTO W/IRRIG & EVAC MULTPLE OBSTRUCTING CLOTS Bilateral 9/27/2019    Procedure: CYSTOSCOPY, CAUTERY BLEEDING, BILATERAL RETROGRADE PYELOGRAM; BLADDER BIOPSY;  Surgeon: Varun Sahni MD;  Location: [facility] OR;  Service: Urology   • SHOULDER ARTHROSCOPY Left       Family History:     Family History   Problem Relation Age of Onset   • Colon polyps Family       Social History:     Social History     Socioeconomic History   • Marital status: /Civil Union     Spouse name: None   • Number of children: None   • Years of education: None   • Highest education level: None   Occupational History   • None   Tobacco Use   • Smoking status: Never   • Smokeless tobacco: Never   Substance and Sexual Activity   • Alcohol use: Not Currently   • Drug use: Never   • Sexual activity: None   Other Topics Concern   • None   Social History Narrative   • None     Social Determinants of Health     Financial Resource Strain: Low Risk    • Difficulty of Paying Living Expenses: Not hard at all   Food Insecurity: No Food Insecurity   • Worried About Running Out of Food in the Last Year: Never true   • Ran Out of Food in the Last Year: Never true   Transportation Needs: No Transportation Needs   • Lack of Transportation (Medical): No   • Lack of Transportation (Non-Medical):  No   Physical Activity: Not on file   Stress: Not on file   Social Connections: Not on file   Intimate Partner Violence: Not on file   Housing Stability: Not on file      Medications and Allergies: Current Outpatient Medications   Medication Sig Dispense Refill   • Synjardy 12 5-1000 MG TABS TAKE 1 TABLET BY MOUTH 2 (TWO) TIMES A DAY 60 tablet 2   • amLODIPine (NORVASC) 5 mg tablet TAKE 1 TABLET (5 MG TOTAL) BY MOUTH DAILY 30 tablet 0   • atorvastatin (LIPITOR) 40 mg tablet TAKE 1 TABLET (40 MG TOTAL) BY MOUTH DAILY 30 tablet 0   • carbamide peroxide (DEBROX) 6 5 % otic solution Administer 5 drops into both ears 2 (two) times a day for 4 days 15 mL 0   • carvedilol (COREG) 6 25 mg tablet TAKE 1 TABLET (6 25 MG TOTAL) BY MOUTH 2 (TWO) TIMES A DAY WITH MEALS  180 tablet 1   • Cholecalciferol (DIALYVITE VITAMIN D3 MAX) 1 25 MG (37422 UT) TABS Take by mouth (Patient not taking: Reported on 10/1/2021)     • Diclofenac Sodium (VOLTAREN) 1 % diclofenac 1 % topical gel   apply 2 grams to affected area four times a day if needed     • Diclofenac Sodium (VOLTAREN) 1 % Apply 2 g topically 4 (four) times a day 100 g 3   • ergocalciferol (VITAMIN D2) 50,000 units TAKE 1 CAPSULE BY MOUTH ONCE A WEEK (Patient not taking: Reported on 10/1/2021) 12 capsule 0   • ezetimibe (ZETIA) 10 mg tablet TAKE 1 TABLET (10 MG TOTAL) BY MOUTH EVERY MORNING  30 tablet 0   • ferrous sulfate 325 (65 FE) MG EC tablet Take 1 tablet every other day 30 tablet 3   • latanoprost (XALATAN) 0 005 % ophthalmic solution      • levothyroxine 88 mcg tablet TAKE 1 TABLET (88 MCG TOTAL) BY MOUTH DAILY 90 tablet 0   • lidocaine (XYLOCAINE) 5 % ointment Apply topically as needed for mild pain 50 g 1   • meclizine (ANTIVERT) 12 5 MG tablet Take 1 tablet (12 5 mg total) by mouth 3 (three) times a day as needed for dizziness 30 tablet 0   • multivitamin (THERAGRAN) TABS Take 1 tablet by mouth daily 30 tablet 1     No current facility-administered medications for this visit       Allergies   Allergen Reactions   • Lisinopril Angioedema     Other reaction(s): Swelling of Lips/Face  Other reaction(s): Swelling of Lips/Face   • Latex      Added based on information entered during case entry, please review and add reactions, type, and severity as needed      Immunizations:     Immunization History   Administered Date(s) Administered   • COVID-19 MODERNA VACC 0 5 ML IM 04/05/2021, 05/05/2021, 12/10/2021, 07/22/2022   • INFLUENZA 12/14/2005, 10/15/2014, 01/27/2016, 09/26/2016, 10/09/2017, 10/01/2021   • Influenza Split 11/20/2012   • Influenza Split High Dose Preservative Free IM 10/09/2017   • Influenza, high dose seasonal 0 7 mL 09/14/2020, 10/01/2021, 11/07/2022   • Pneumococcal Conjugate 13-Valent 10/09/2017   • Pneumococcal Polysaccharide PPV23 04/21/2011      Health Maintenance: There are no preventive care reminders to display for this patient  Topic Date Due   • COVID-19 Vaccine (5 - Booster for Moderna series) 09/16/2022      Medicare Screening Tests and Risk Assessments:     Jennifer Peralta is here for her Subsequent Wellness visit  Health Risk Assessment:   Patient rates overall health as good  Patient feels that their physical health rating is same  Patient is satisfied with their life  Eyesight was rated as same  Hearing was rated as same  Patient feels that their emotional and mental health rating is same  Patient states they are never, rarely unusually tired/fatigued  Pain experienced in the last 7 days has been none  Patient states that she has experienced no weight loss or gain in last 6 months  Depression Screening:   PHQ-2 Score: 0      Fall Risk Screening: In the past year, patient has experienced: no history of falling in past year      Urinary Incontinence Screening:   Patient has not leaked urine accidently in the last six months  Home Safety:  Patient does not have trouble with stairs inside or outside of their home  Patient has working smoke alarms and has working carbon monoxide detector  Home safety hazards include: none  Nutrition:   Current diet is Regular  Started supplementing with Boost, two per day       Medications: Patient is not currently taking any over-the-counter supplements  Patient is able to manage medications  Dispill packs from pharmacy  Activities of Daily Living (ADLs)/Instrumental Activities of Daily Living (IADLs):   Walk and transfer into and out of bed and chair?: Yes  Dress and groom yourself?: Yes    Bathe or shower yourself?: Yes    Feed yourself? Yes  Do your laundry/housekeeping?: Yes  Manage your money, pay your bills and track your expenses?: Yes  Make your own meals?: Yes    Do your own shopping?: Yes    Previous Hospitalizations:   Any hospitalizations or ED visits within the last 12 months?: No      Advance Care Planning:   Living will: No    Durable POA for healthcare: Yes    Advanced directive: No      Cognitive Screening:   Provider or family/friend/caregiver concerned regarding cognition?: No    PREVENTIVE SCREENINGS      Cardiovascular Screening:    General: Screening Not Indicated and History Lipid Disorder      Diabetes Screening:     General: Screening Not Indicated and History Diabetes      Colorectal Cancer Screening:     General: Screening Not Indicated      Breast Cancer Screening:     General: Screening Not Indicated      Cervical Cancer Screening:    General: Screening Not Indicated      Osteoporosis Screening:    General: Risks and Benefits Discussed    Due for: DXA Axial      Abdominal Aortic Aneurysm (AAA) Screening:        General: Screening Not Indicated      Lung Cancer Screening:     General: Screening Not Indicated      Hepatitis C Screening:    General: Screening Not Indicated    Screening, Brief Intervention, and Referral to Treatment (SBIRT)    Screening  Typical number of drinks in a day: 0  Typical number of drinks in a week: 0  Interpretation: Low risk drinking behavior  AUDIT-C Screenin) How often did you have a drink containing alcohol in the past year? monthly or less  2) How many drinks did you have on a typical day when you were drinking in the past year? 1 to 2  3) How often did you have 6 or more drinks on one occasion in the past year? never    AUDIT-C Score: 1  Interpretation: Score 0-2 (female): Negative screen for alcohol misuse    Single Item Drug Screening:  How often have you used an illegal drug (including marijuana) or a prescription medication for non-medical reasons in the past year? never    Single Item Drug Screen Score: 0  Interpretation: Negative screen for possible drug use disorder    Other Counseling Topics:   Car/seat belt/driving safety and calcium and vitamin D intake and regular weightbearing exercise  No results found       Physical Exam:     /70 (BP Location: Left arm, Patient Position: Sitting, Cuff Size: Standard)   Pulse 85   Temp 97 9 °F (36 6 °C) (Temporal)   Resp 16   Ht 5' (1 524 m)   Wt 67 6 kg (149 lb)   SpO2 98%   BMI 29 10 kg/m²     Physical Exam     CECIL Sarkar 3

## 2023-04-24 ENCOUNTER — RX RENEWAL (OUTPATIENT)
Age: 58
End: 2023-04-24

## 2023-05-25 ENCOUNTER — RX RENEWAL (OUTPATIENT)
Age: 58
End: 2023-05-25

## 2023-05-25 RX ORDER — IBUPROFEN 800 MG/1
800 TABLET, FILM COATED ORAL
Qty: 60 | Refills: 0 | Status: ACTIVE | COMMUNITY
Start: 2022-12-12 | End: 1900-01-01

## 2023-08-08 ENCOUNTER — APPOINTMENT (OUTPATIENT)
Dept: INTERNAL MEDICINE | Facility: CLINIC | Age: 58
End: 2023-08-08

## 2023-10-05 NOTE — DISCHARGE NOTE ADULT - CLICK TO LAUNCH ORM
. Doxepin Counseling:  Patient advised that the medication is sedating and not to drive a car after taking this medication. Patient informed of potential adverse effects including but not limited to dry mouth, urinary retention, and blurry vision.  The patient verbalized understanding of the proper use and possible adverse effects of doxepin.  All of the patient's questions and concerns were addressed.

## 2024-04-08 NOTE — ED ADULT TRIAGE NOTE - ARRIVAL FROM
How Severe Is Your Skin Lesion?: mild Have Your Skin Lesions Been Treated?: not been treated Is This A New Presentation, Or A Follow-Up?: Skin Lesions Doctor's office

## 2024-08-26 ENCOUNTER — TRANSCRIPTION ENCOUNTER (OUTPATIENT)
Age: 59
End: 2024-08-26

## 2024-08-26 ENCOUNTER — APPOINTMENT (OUTPATIENT)
Dept: ORTHOPEDIC SURGERY | Facility: CLINIC | Age: 59
End: 2024-08-26
Payer: COMMERCIAL

## 2024-08-26 DIAGNOSIS — M25.561 PAIN IN RIGHT KNEE: ICD-10-CM

## 2024-08-26 DIAGNOSIS — S52.502A UNSPECIFIED FRACTURE OF THE LOWER END OF LEFT RADIUS, INITIAL ENCOUNTER FOR CLOSED FRACTURE: ICD-10-CM

## 2024-08-26 DIAGNOSIS — M17.0 BILATERAL PRIMARY OSTEOARTHRITIS OF KNEE: ICD-10-CM

## 2024-08-26 DIAGNOSIS — M25.562 PAIN IN RIGHT KNEE: ICD-10-CM

## 2024-08-26 PROCEDURE — 73564 X-RAY EXAM KNEE 4 OR MORE: CPT | Mod: 50

## 2024-08-26 PROCEDURE — 73110 X-RAY EXAM OF WRIST: CPT | Mod: 50

## 2024-08-26 PROCEDURE — 99213 OFFICE O/P EST LOW 20 MIN: CPT

## 2024-08-26 RX ORDER — IBUPROFEN 600 MG/1
600 TABLET, FILM COATED ORAL
Qty: 60 | Refills: 1 | Status: ACTIVE | COMMUNITY
Start: 2024-08-26 | End: 1900-01-01

## 2024-08-26 NOTE — HISTORY OF PRESENT ILLNESS
[de-identified] : Pt is a 58 y/o female with left distal radius fracture.  She slipped on the dance floor at a [employer] party on Wednesday 12/7/22.  She had pain immediately.  She went to [facility], where xrays revealed a left distal radius fracture.  The fracture was reduced and a splint was applied.  She was advised to follow up with a specialist. She was initially treated in the office on 12/12/22 at which time I recommended nonoperative management given the success of the reduction. She was placed into a left short arm cast. She returns for repeat xrays in the cast on 12/22/22. She stated she felt a pulsing/throbbing sensation to the wrist. She presents once again for repeat xrays on 1/12/23. She notes continued swelling to the digits and states she has numbness to the left long and ring fingers which has only been since the injury. She returned for repeat xrays and cast removal on 2/2/23. She noted residual pain and stiffness since the cast was removed. She returned on 3/2/23 and notes difficulty with range of motion, notably into extension. She is currently receiving hand therapy. She denies numbness and tingling to the digits but states when she touches her skin, it does feel different than the right side. She has been wearing a compression glove for edema control. She presents on 4/13/23 and is doing well with regard to the wrist and is in hand therapy. She is still however lacking terminal flexion of the digits. She additionally now complains of left shoulder pain. She states she believes it began due to increased strengthening that she has been performing at therapy for her left shoulder. She has increased pain with reaching.   Today, Aug 26, 2024, the patient presents for follow-up and further management. She states that she has continued pain pertaining to both knees at this time. She states that she has difficulty with ambulation and denies any buckling or locking of her knees. The patient denies any symptoms of numbness, tingling, or weakness.

## 2024-08-26 NOTE — PHYSICAL EXAM
[de-identified] : Patient is WDWN, alert, and in no acute distress. Breathing is unlabored. She is grossly oriented to person, place, and time.  Left Wrist: Inspection/Palpation: No tenderness, edema, or deformities. No skin lesions or discoloration. Range of Motion: Full extension and flexion. Sensation is intact and normal.       [de-identified] : AP, lateral, skyline, and tunnel views of the bilateral knee were obtained and revealed mild to moderate osteoarthritis of both knees. No tumors or fractures noted.   Xrays 3v of both wrists show no evidence of a fracture

## 2024-08-26 NOTE — HISTORY OF PRESENT ILLNESS
[de-identified] : Pt is a 58 y/o female with left distal radius fracture.  She slipped on the dance floor at a [employer] party on Wednesday 12/7/22.  She had pain immediately.  She went to [facility], where xrays revealed a left distal radius fracture.  The fracture was reduced and a splint was applied.  She was advised to follow up with a specialist. She was initially treated in the office on 12/12/22 at which time I recommended nonoperative management given the success of the reduction. She was placed into a left short arm cast. She returns for repeat xrays in the cast on 12/22/22. She stated she felt a pulsing/throbbing sensation to the wrist. She presents once again for repeat xrays on 1/12/23. She notes continued swelling to the digits and states she has numbness to the left long and ring fingers which has only been since the injury. She returned for repeat xrays and cast removal on 2/2/23. She noted residual pain and stiffness since the cast was removed. She returned on 3/2/23 and notes difficulty with range of motion, notably into extension. She is currently receiving hand therapy. She denies numbness and tingling to the digits but states when she touches her skin, it does feel different than the right side. She has been wearing a compression glove for edema control. She presents on 4/13/23 and is doing well with regard to the wrist and is in hand therapy. She is still however lacking terminal flexion of the digits. She additionally now complains of left shoulder pain. She states she believes it began due to increased strengthening that she has been performing at therapy for her left shoulder. She has increased pain with reaching.   Today, Aug 26, 2024, the patient presents for follow-up and further management. She states that she has continued pain pertaining to both knees at this time. She states that she has difficulty with ambulation and denies any buckling or locking of her knees. The patient denies any symptoms of numbness, tingling, or weakness.

## 2024-08-26 NOTE — DISCUSSION/SUMMARY
[de-identified] : The underlying pathophysiology was reviewed with the patient. XR films were reviewed with the patient. Discussed at length the nature of the patient's condition. Their bilateral knee symptoms appear to be secondary to osteoarthritis of both knees. The patient and I discussed her options regarding treatment.   Patient was advised to continue with observation of her symptoms. The patient should continue to remain active and exercise regularly. Discussed the usage of a cane when symptoms acutely worsen.   Patient was advised to take OTC medications and topical analgesic for pain management.   RX: Ibuprofen 600mg  All questions answered, understanding verbalized. Patient in agreement with plan of care.  If the patient begins to experience any changes or severe exacerbation of her symptoms, she should reach out to me as soon as possible. Otherwise, she should return to me as needed.

## 2024-08-26 NOTE — ADDENDUM
[FreeTextEntry1] : I, Surjit Rey Jr, acted solely as a scribe for Dr. Wang Siddiqui on this date 08/26/2024  All medical record entries made by the Scribe were at my, Dr. Wang Siddiqui, direction and personally dictated by me on 08/26/2024 . I have reviewed the chart and agree that the record accurately reflects my personal performance of the history, physical exam, assessment and plan. I have also personally directed, reviewed, and agreed with the chart.

## 2024-08-26 NOTE — DISCUSSION/SUMMARY
[de-identified] : The underlying pathophysiology was reviewed with the patient. XR films were reviewed with the patient. Discussed at length the nature of the patient's condition. Their bilateral knee symptoms appear to be secondary to osteoarthritis of both knees. The patient and I discussed her options regarding treatment.   Patient was advised to continue with observation of her symptoms. The patient should continue to remain active and exercise regularly. Discussed the usage of a cane when symptoms acutely worsen.   Patient was advised to take OTC medications and topical analgesic for pain management.   RX: Ibuprofen 600mg  All questions answered, understanding verbalized. Patient in agreement with plan of care.  If the patient begins to experience any changes or severe exacerbation of her symptoms, she should reach out to me as soon as possible. Otherwise, she should return to me as needed.

## 2024-08-26 NOTE — PHYSICAL EXAM
[de-identified] : Patient is WDWN, alert, and in no acute distress. Breathing is unlabored. She is grossly oriented to person, place, and time.  Left Wrist: Inspection/Palpation: No tenderness, edema, or deformities. No skin lesions or discoloration. Range of Motion: Full extension and flexion. Sensation is intact and normal.       [de-identified] : AP, lateral, skyline, and tunnel views of the bilateral knee were obtained and revealed mild to moderate osteoarthritis of both knees. No tumors or fractures noted.   Xrays 3v of both wrists show no evidence of a fracture

## 2024-10-24 ENCOUNTER — RX RENEWAL (OUTPATIENT)
Age: 59
End: 2024-10-24

## 2024-12-23 ENCOUNTER — RX RENEWAL (OUTPATIENT)
Age: 59
End: 2024-12-23

## 2025-03-06 ENCOUNTER — RX RENEWAL (OUTPATIENT)
Age: 60
End: 2025-03-06

## 2025-05-20 ENCOUNTER — RX RENEWAL (OUTPATIENT)
Age: 60
End: 2025-05-20